# Patient Record
Sex: MALE | Race: WHITE | NOT HISPANIC OR LATINO | Employment: OTHER | URBAN - METROPOLITAN AREA
[De-identification: names, ages, dates, MRNs, and addresses within clinical notes are randomized per-mention and may not be internally consistent; named-entity substitution may affect disease eponyms.]

---

## 2017-05-25 ENCOUNTER — HOSPITAL ENCOUNTER (EMERGENCY)
Facility: HOSPITAL | Age: 82
Discharge: HOME | End: 2017-05-25
Admitting: EMERGENCY MEDICINE
Payer: MEDICARE

## 2017-05-25 VITALS
RESPIRATION RATE: 20 BRPM | OXYGEN SATURATION: 96 % | DIASTOLIC BLOOD PRESSURE: 84 MMHG | TEMPERATURE: 97.8 F | WEIGHT: 190 LBS | SYSTOLIC BLOOD PRESSURE: 177 MMHG | HEART RATE: 57 BPM

## 2017-05-25 DIAGNOSIS — S51.819A LACERATION OF FOREARM: Primary | ICD-10-CM

## 2017-05-25 PROCEDURE — 99282 EMERGENCY DEPT VISIT SF MDM: CPT

## 2017-05-25 PROCEDURE — 90471 IMMUNIZATION ADMIN: CPT

## 2017-05-25 PROCEDURE — 90715 TDAP VACCINE 7 YRS/> IM: CPT | Performed by: PHYSICIAN ASSISTANT

## 2017-05-25 RX ORDER — BACITRACIN, NEOMYCIN, POLYMYXIN B 400; 3.5; 5 [USP'U]/G; MG/G; [USP'U]/G
1 OINTMENT TOPICAL ONCE
Status: COMPLETED | OUTPATIENT
Start: 2017-05-25 | End: 2017-05-25

## 2017-05-25 RX ORDER — LIDOCAINE HYDROCHLORIDE AND EPINEPHRINE 20; 5 MG/ML; UG/ML
1 INJECTION, SOLUTION EPIDURAL; INFILTRATION; INTRACAUDAL; PERINEURAL ONCE
Status: COMPLETED | OUTPATIENT
Start: 2017-05-25 | End: 2017-05-25

## 2017-05-25 RX ORDER — LIDOCAINE HYDROCHLORIDE AND EPINEPHRINE BITARTRATE 20; .01 MG/ML; MG/ML
5 INJECTION, SOLUTION SUBCUTANEOUS ONCE
Status: DISCONTINUED | OUTPATIENT
Start: 2017-05-25 | End: 2017-05-25

## 2017-05-25 RX ORDER — AMLODIPINE BESYLATE 5 MG/1
10 TABLET ORAL DAILY
COMMUNITY

## 2017-05-25 RX ORDER — CARVEDILOL 12.5 MG/1
12.5 TABLET ORAL 2 TIMES DAILY WITH MEALS
COMMUNITY

## 2017-05-25 RX ADMIN — LIDOCAINE HYDROCHLORIDE,EPINEPHRINE BITARTRATE 1 ML: 20; .005 INJECTION, SOLUTION EPIDURAL; INFILTRATION; INTRACAUDAL; PERINEURAL at 13:25

## 2017-05-25 RX ADMIN — BACITRACIN, NEOMYCIN, POLYMYXIN B 1 SMALL APPLICATION: 400; 3.5; 5 OINTMENT TOPICAL at 13:45

## 2017-05-25 RX ADMIN — TETANUS TOXOID, REDUCED DIPHTHERIA TOXOID AND ACELLULAR PERTUSSIS VACCINE, ADSORBED 0.5 ML: 5; 2.5; 8; 8; 2.5 SUSPENSION INTRAMUSCULAR at 13:42

## 2017-06-01 ENCOUNTER — HOSPITAL ENCOUNTER (EMERGENCY)
Facility: HOSPITAL | Age: 82
Discharge: HOME | End: 2017-06-01
Attending: EMERGENCY MEDICINE | Admitting: EMERGENCY MEDICINE
Payer: MEDICARE

## 2017-06-01 VITALS
HEART RATE: 57 BPM | OXYGEN SATURATION: 96 % | DIASTOLIC BLOOD PRESSURE: 86 MMHG | RESPIRATION RATE: 18 BRPM | SYSTOLIC BLOOD PRESSURE: 187 MMHG | TEMPERATURE: 97.5 F

## 2017-06-01 DIAGNOSIS — Z48.02 VISIT FOR SUTURE REMOVAL: Primary | ICD-10-CM

## 2017-06-01 PROCEDURE — 99281 EMR DPT VST MAYX REQ PHY/QHP: CPT

## 2017-06-06 ENCOUNTER — HOSPITAL ENCOUNTER (EMERGENCY)
Facility: HOSPITAL | Age: 82
Discharge: HOME | End: 2017-06-06
Attending: EMERGENCY MEDICINE | Admitting: EMERGENCY MEDICINE
Payer: MEDICARE

## 2017-06-06 VITALS
TEMPERATURE: 97.4 F | OXYGEN SATURATION: 100 % | RESPIRATION RATE: 18 BRPM | HEART RATE: 61 BPM | DIASTOLIC BLOOD PRESSURE: 86 MMHG | SYSTOLIC BLOOD PRESSURE: 186 MMHG

## 2017-06-06 DIAGNOSIS — Z48.02 VISIT FOR SUTURE REMOVAL: Primary | ICD-10-CM

## 2017-06-06 PROCEDURE — 99281 EMR DPT VST MAYX REQ PHY/QHP: CPT

## 2019-09-23 ENCOUNTER — ANESTHESIA EVENT (OUTPATIENT)
Dept: GASTROENTEROLOGY | Facility: AMBULARY SURGERY CENTER | Age: 84
End: 2019-09-23

## 2019-09-23 ENCOUNTER — OFFICE VISIT (OUTPATIENT)
Dept: GASTROENTEROLOGY | Facility: AMBULARY SURGERY CENTER | Age: 84
End: 2019-09-23
Payer: MEDICARE

## 2019-09-23 VITALS
WEIGHT: 195.25 LBS | DIASTOLIC BLOOD PRESSURE: 70 MMHG | SYSTOLIC BLOOD PRESSURE: 130 MMHG | TEMPERATURE: 98.8 F | HEART RATE: 61 BPM | BODY MASS INDEX: 31.38 KG/M2 | HEIGHT: 66 IN

## 2019-09-23 DIAGNOSIS — K21.9 GASTROESOPHAGEAL REFLUX DISEASE, ESOPHAGITIS PRESENCE NOT SPECIFIED: ICD-10-CM

## 2019-09-23 DIAGNOSIS — R13.19 ESOPHAGEAL DYSPHAGIA: Primary | ICD-10-CM

## 2019-09-23 PROCEDURE — 99204 OFFICE O/P NEW MOD 45 MIN: CPT | Performed by: INTERNAL MEDICINE

## 2019-09-23 RX ORDER — DIPHENOXYLATE HYDROCHLORIDE AND ATROPINE SULFATE 2.5; .025 MG/1; MG/1
1 TABLET ORAL DAILY
COMMUNITY
End: 2019-09-24 | Stop reason: SDUPTHER

## 2019-09-23 RX ORDER — BIOTIN 5 MG
1 TABLET ORAL DAILY
COMMUNITY

## 2019-09-23 NOTE — PROGRESS NOTES
Assessment and Plan    #1  dysphagia  - possible  Zenker diverticulum/esophageal stricture/achalasia/esophageal stricture/ Schatzki ring/ esophageal ring/ R/O malignancy    · EGD with next visit/ biopsy/ dilation of ring if needed    #2  GERD    · Continue TUMS prn  · Consider protonix 40mg PO depending on EGD resuls    --------------------------------------------------------------------------------------------------------------------    Chief Complaint: food getting stuck in mid esophagus    HPI: Adan Beltre is a 80 y o  male who presents today for follow up for history of food getting stuck in esophagus    1 year PTC, patient develop  Symptoms of food getting stuck in mid sternum, causing pain, worsen with drinking water, he has to wait 15 mins for move to move from esophagus to stomach which causes pain as it moves  This is worsen with dry foods, chicken and steak  He also complaints of hoarseness as the day goes by     2 weeks PTC, patient develop difficulty swallowing which he went to see primary physician and diagnosed with swollen lymph nodes and was treated with claritin, but the globus sensation remained    Patient also reported long term problem of GERD that happens atleast 1 per week which he takes Tums that relieves the problem  He also complaints of drooling when he sleeps which has been going on for a long time  Patient denies any nausea, vomiting, abdominal pain, unexpected weight loss, diarrhea, constipation, blood in stool, or black tarry stools  Patient's last colonoscopy was 7 years ago and show Ulcerative colitis was treated and no maintenance medication  Patient's last endoscopy was many years ago and was found to have ulcer when he was younger           Review of Systems:   General: negative for fatigue, fever, night sweats or unexpected weight loss  Psychological: negative for anxiety or depression  Ophthalmic: negative for blurry vision or scleral icterus  ENT: negative for headaches, oral lesions, sore throat, vocal changes or dysphagia  Hematological and Lymphatic: negative for pallor or swollen lymph nodes  Respiratory: negative for cough, shortness of breath or wheezing  Cardiovascular: negative for chest pain, edema or murmur  Gastrointestinal: as mentioned in HPI  Genito-Urinary: negative for dysuria or incontinence  Musculoskeletal: negative for joint pain, joint stiffness or joint swelling  Dermatological: negative for pruritus, rash, or jaundice    Current Medications  Current Outpatient Medications   Medication Sig Dispense Refill    amLODIPine (NORVASC) 5 mg tablet Take 5 mg by mouth daily      carvedilol (COREG) 12 5 mg tablet Take 12 5 mg by mouth 2 (two) times a day with meals      Krill Oil 1000 MG CAPS Take 1 tablet by mouth daily      Multiple Vitamins-Minerals (ICAPS AREDS 2 PO) Take 1 capsule by mouth daily      multivitamin (THERAGRAN) TABS Take 1 tablet by mouth daily       No current facility-administered medications for this visit          Past Medical History  Past Medical History:   Diagnosis Date    Hyperlipidemia     Hypertension     PVC (premature ventricular contraction)        Past Surgical History  Past Surgical History:   Procedure Laterality Date    KNEE SURGERY  1994       Past Social History   Social History     Socioeconomic History    Marital status: /Civil Union     Spouse name: None    Number of children: None    Years of education: None    Highest education level: None   Occupational History    None   Social Needs    Financial resource strain: None    Food insecurity:     Worry: None     Inability: None    Transportation needs:     Medical: None     Non-medical: None   Tobacco Use    Smoking status: Former Smoker    Smokeless tobacco: Never Used    Tobacco comment: Quit at 21years of age    Substance and Sexual Activity    Alcohol use: Yes     Comment: Socially     Drug use: No    Sexual activity: None   Lifestyle  Physical activity:     Days per week: None     Minutes per session: None    Stress: None   Relationships    Social connections:     Talks on phone: None     Gets together: None     Attends Scientologist service: None     Active member of club or organization: None     Attends meetings of clubs or organizations: None     Relationship status: None    Intimate partner violence:     Fear of current or ex partner: None     Emotionally abused: None     Physically abused: None     Forced sexual activity: None   Other Topics Concern    None   Social History Narrative    None       The following portions of the patient's history were reviewed and updated as appropriate: allergies, current medications, past family history, past medical history, past social history, past surgical history and problem list     Vital Signs  Vitals:    09/23/19 0822   BP: 130/70   BP Location: Right arm   Patient Position: Sitting   Cuff Size: Standard   Pulse: 61   Temp: 98 8 °F (37 1 °C)   Weight: 88 6 kg (195 lb 4 oz)   Height: 5' 6" (1 676 m)       Physical Exam:  General appearance: alert, cooperative, no distress  HEENT: normocephalic, anicteric, no eye erythema or discharge, no oropharyngeal thrush  Neck: supple, trachea midline, no adenopathy  Lungs: CTA b/l, no rales, rhonchi, or wheezing, unlabored respirations  Heart: RRR, no murmur, rubs, or gallops  Abdomen: soft, non-tender, non-distended, normal bowel sounds, no masses or organomegaly  Rectal: deferred  Extremities: no cyanosis, clubbing, or edema  Musculoskeletal: normal gait  Skin: color and texture normal, no jaundice, no rashes or lesions  Psychiatric: alert and oriented, normal affect and behavior

## 2019-09-23 NOTE — LETTER
September 23, 2019     Anaya Mott, [address] [facility]     Patient: Beatrice Suarez   YOB: 1935   Date of Visit: 9/23/2019       Dear Dr Manny Vargas:    Thank you for referring Chuck Robles to me for evaluation  Below are my notes for this consultation  If you have questions, please do not hesitate to call me  I look forward to following your patient along with you  Sincerely,        Lata Dunn MD        CC: No Recipients  Lata Dunn MD  9/23/2019 10:05 AM  Sign at close encounter  Assessment and Plan    #1  dysphagia  - possible  Zenker diverticulum/esophageal stricture/achalasia/esophageal stricture/ Schatzki ring/ esophageal ring/ R/O malignancy    · EGD with next visit/ biopsy/ dilation of ring if needed    #2  GERD    · Continue TUMS prn  · Consider protonix 40mg PO depending on EGD resuls    --------------------------------------------------------------------------------------------------------------------    Chief Complaint: food getting stuck in mid esophagus    HPI: Beatrice Suarez is a 80 y o  male who presents today for follow up for history of food getting stuck in esophagus    1 year PTC, patient develop  Symptoms of food getting stuck in mid sternum, causing pain, worsen with drinking water, he has to wait 15 mins for move to move from esophagus to stomach which causes pain as it moves  This is worsen with dry foods, chicken and steak  He also complaints of hoarseness as the day goes by     2 weeks PTC, patient develop difficulty swallowing which he went to see primary physician and diagnosed with swollen lymph nodes and was treated with claritin, but the globus sensation remained    Patient also reported long term problem of GERD that happens atleast 1 per week which he takes Tums that relieves the problem  He also complaints of drooling when he sleeps which has been going on for a long time         Patient denies any nausea, vomiting, abdominal pain, unexpected weight loss, diarrhea, constipation, blood in stool, or black tarry stools  Patient's last colonoscopy was 7 years ago and show Ulcerative colitis was treated and no maintenance medication  Patient's last endoscopy was many years ago and was found to have ulcer when he was younger  Review of Systems:   General: negative for fatigue, fever, night sweats or unexpected weight loss  Psychological: negative for anxiety or depression  Ophthalmic: negative for blurry vision or scleral icterus  ENT: negative for headaches, oral lesions, sore throat, vocal changes or dysphagia  Hematological and Lymphatic: negative for pallor or swollen lymph nodes  Respiratory: negative for cough, shortness of breath or wheezing  Cardiovascular: negative for chest pain, edema or murmur  Gastrointestinal: as mentioned in HPI  Genito-Urinary: negative for dysuria or incontinence  Musculoskeletal: negative for joint pain, joint stiffness or joint swelling  Dermatological: negative for pruritus, rash, or jaundice    Current Medications  Current Outpatient Medications   Medication Sig Dispense Refill    amLODIPine (NORVASC) 5 mg tablet Take 5 mg by mouth daily      carvedilol (COREG) 12 5 mg tablet Take 12 5 mg by mouth 2 (two) times a day with meals      Krill Oil 1000 MG CAPS Take 1 tablet by mouth daily      Multiple Vitamins-Minerals (ICAPS AREDS 2 PO) Take 1 capsule by mouth daily      multivitamin (THERAGRAN) TABS Take 1 tablet by mouth daily       No current facility-administered medications for this visit          Past Medical History  Past Medical History:   Diagnosis Date    Hyperlipidemia     Hypertension     PVC (premature ventricular contraction)        Past Surgical History  Past Surgical History:   Procedure Laterality Date    KNEE SURGERY  1994       Past Social History   Social History     Socioeconomic History    Marital status: /Civil Union     Spouse name: None    Number of children: None    Years of education: None    Highest education level: None   Occupational History    None   Social Needs    Financial resource strain: None    Food insecurity:     Worry: None     Inability: None    Transportation needs:     Medical: None     Non-medical: None   Tobacco Use    Smoking status: Former Smoker    Smokeless tobacco: Never Used    Tobacco comment: Quit at 21years of age    Substance and Sexual Activity    Alcohol use: Yes     Comment: Socially     Drug use: No    Sexual activity: None   Lifestyle    Physical activity:     Days per week: None     Minutes per session: None    Stress: None   Relationships    Social connections:     Talks on phone: None     Gets together: None     Attends Mu-ism service: None     Active member of club or organization: None     Attends meetings of clubs or organizations: None     Relationship status: None    Intimate partner violence:     Fear of current or ex partner: None     Emotionally abused: None     Physically abused: None     Forced sexual activity: None   Other Topics Concern    None   Social History Narrative    None       The following portions of the patient's history were reviewed and updated as appropriate: allergies, current medications, past family history, past medical history, past social history, past surgical history and problem list     Vital Signs  Vitals:    09/23/19 0822   BP: 130/70   BP Location: Right arm   Patient Position: Sitting   Cuff Size: Standard   Pulse: 61   Temp: 98 8 °F (37 1 °C)   Weight: 88 6 kg (195 lb 4 oz)   Height: 5' 6" (1 676 m)       Physical Exam:  General appearance: alert, cooperative, no distress  HEENT: normocephalic, anicteric, no eye erythema or discharge, no oropharyngeal thrush  Neck: supple, trachea midline, no adenopathy  Lungs: CTA b/l, no rales, rhonchi, or wheezing, unlabored respirations  Heart: RRR, no murmur, rubs, or gallops  Abdomen: soft, non-tender, non-distended, normal bowel sounds, no masses or organomegaly  Rectal: deferred  Extremities: no cyanosis, clubbing, or edema  Musculoskeletal: normal gait  Skin: color and texture normal, no jaundice, no rashes or lesions  Psychiatric: alert and oriented, normal affect and behavior

## 2019-09-24 ENCOUNTER — HOSPITAL ENCOUNTER (OUTPATIENT)
Dept: GASTROENTEROLOGY | Facility: AMBULARY SURGERY CENTER | Age: 84
Setting detail: OUTPATIENT SURGERY
Discharge: HOME | End: 2019-09-24
Attending: INTERNAL MEDICINE | Admitting: INTERNAL MEDICINE
Payer: MEDICARE

## 2019-09-24 ENCOUNTER — ANESTHESIA (OUTPATIENT)
Dept: GASTROENTEROLOGY | Facility: AMBULARY SURGERY CENTER | Age: 84
End: 2019-09-24

## 2019-09-24 VITALS
DIASTOLIC BLOOD PRESSURE: 67 MMHG | WEIGHT: 188 LBS | HEIGHT: 69 IN | HEART RATE: 50 BPM | OXYGEN SATURATION: 98 % | SYSTOLIC BLOOD PRESSURE: 156 MMHG | RESPIRATION RATE: 18 BRPM | TEMPERATURE: 97 F | BODY MASS INDEX: 27.85 KG/M2

## 2019-09-24 DIAGNOSIS — K21.9 GASTROESOPHAGEAL REFLUX DISEASE, ESOPHAGITIS PRESENCE NOT SPECIFIED: ICD-10-CM

## 2019-09-24 DIAGNOSIS — K21.00 GASTROESOPHAGEAL REFLUX DISEASE WITH ESOPHAGITIS: Primary | ICD-10-CM

## 2019-09-24 PROCEDURE — C1726 CATH, BAL DIL, NON-VASCULAR: HCPCS

## 2019-09-24 PROCEDURE — 88305 TISSUE EXAM BY PATHOLOGIST: CPT | Performed by: PATHOLOGY

## 2019-09-24 PROCEDURE — 43239 EGD BIOPSY SINGLE/MULTIPLE: CPT | Performed by: INTERNAL MEDICINE

## 2019-09-24 PROCEDURE — 43249 ESOPH EGD DILATION <30 MM: CPT | Performed by: INTERNAL MEDICINE

## 2019-09-24 PROCEDURE — 1124F ACP DISCUSS-NO DSCNMKR DOCD: CPT | Performed by: INTERNAL MEDICINE

## 2019-09-24 RX ORDER — PANTOPRAZOLE SODIUM 40 MG/1
40 TABLET, DELAYED RELEASE ORAL 2 TIMES DAILY
Qty: 60 TABLET | Refills: 3 | Status: SHIPPED | OUTPATIENT
Start: 2019-09-24 | End: 2020-02-25

## 2019-09-24 RX ORDER — SODIUM CHLORIDE, SODIUM LACTATE, POTASSIUM CHLORIDE, CALCIUM CHLORIDE 600; 310; 30; 20 MG/100ML; MG/100ML; MG/100ML; MG/100ML
125 INJECTION, SOLUTION INTRAVENOUS CONTINUOUS
Status: DISCONTINUED | OUTPATIENT
Start: 2019-09-24 | End: 2019-09-28 | Stop reason: HOSPADM

## 2019-09-24 RX ORDER — PROPOFOL 10 MG/ML
INJECTION, EMULSION INTRAVENOUS AS NEEDED
Status: DISCONTINUED | OUTPATIENT
Start: 2019-09-24 | End: 2019-09-24 | Stop reason: SURG

## 2019-09-24 RX ADMIN — PROPOFOL 80 MG: 10 INJECTION, EMULSION INTRAVENOUS at 10:37

## 2019-09-24 RX ADMIN — PROPOFOL 50 MG: 10 INJECTION, EMULSION INTRAVENOUS at 10:33

## 2019-09-24 RX ADMIN — PROPOFOL 50 MG: 10 INJECTION, EMULSION INTRAVENOUS at 10:28

## 2019-09-24 RX ADMIN — PROPOFOL 40 MG: 10 INJECTION, EMULSION INTRAVENOUS at 10:42

## 2019-09-24 RX ADMIN — SODIUM CHLORIDE, SODIUM LACTATE, POTASSIUM CHLORIDE, AND CALCIUM CHLORIDE: .6; .31; .03; .02 INJECTION, SOLUTION INTRAVENOUS at 09:50

## 2019-09-24 RX ADMIN — PROPOFOL 20 MG: 10 INJECTION, EMULSION INTRAVENOUS at 10:39

## 2019-09-24 RX ADMIN — SODIUM CHLORIDE, SODIUM LACTATE, POTASSIUM CHLORIDE, AND CALCIUM CHLORIDE 125 ML/HR: .6; .31; .03; .02 INJECTION, SOLUTION INTRAVENOUS at 09:14

## 2019-09-24 NOTE — H&P
History and Physical - [facility]  Atiya García 80 y o  male MRN: [id number]    HPI: Atiya García is a 80y o  year old male who presents with GERD, dysphagia  Review of Systems    Historical Information   Past Medical History:   Diagnosis Date    Age-related macular degeneration     Hypertension     PVC (premature ventricular contraction)      Past Surgical History:   Procedure Laterality Date    KNEE SURGERY  1994     Social History   Social History     Substance and Sexual Activity   Alcohol Use Yes    Frequency: 4 or more times a week    Comment: Socially      Social History     Substance and Sexual Activity   Drug Use No     Social History     Tobacco Use   Smoking Status Former Smoker   Smokeless Tobacco Never Used   Tobacco Comment    Quit at 21years of age      Family History   Problem Relation Age of Onset    Uterine cancer Mother     Stroke Father     Prostate cancer Father        Meds/Allergies       (Not in a hospital admission)    Allergies   Allergen Reactions    Penicillins Other (See Comments)       Objective     /78   Pulse (!) 48   Temp (!) 97 2 °F (36 2 °C) (Temporal)   Resp 18   Ht 5' 9" (1 753 m)   Wt 85 3 kg (188 lb)   SpO2 97%   BMI 27 76 kg/m²       PHYSICAL EXAM    Gen: NAD  CV: RRR  CHEST: Clear  ABD: soft, NT/ND  EXT: no edema  Neuro: AAO      ASSESSMENT/PLAN:  This is a 80y o  year old male here for GERD, dysphagia           PLAN:   Procedure: Keyon Avalos

## 2019-09-24 NOTE — ANESTHESIA PREPROCEDURE EVALUATION
Review of Systems/Medical History  Patient summary reviewed  Chart reviewed  No history of anesthetic complications     Cardiovascular  Exercise tolerance (METS): >4,  Hypertension ,    Pulmonary  Smoker ex-smoker  ,        GI/Hepatic    GERD ,             Endo/Other     GYN       Hematology   Musculoskeletal       Neurology   Psychology           Physical Exam    Airway    Mallampati score: II  TM Distance: >3 FB  Neck ROM: full     Dental   No notable dental hx     Cardiovascular  Cardiovascular exam normal    Pulmonary  Pulmonary exam normal     Other Findings        Anesthesia Plan  ASA Score- 2     Anesthesia Type- IV sedation with anesthesia with ASA Monitors  Additional Monitors:   Airway Plan:         Plan Factors-    Induction-     Postoperative Plan-     Informed Consent- Anesthetic plan and risks discussed with patient  I personally reviewed this patient with the CRNA  Discussed and agreed on the Anesthesia Plan with the CRNA  Soniya Garcia

## 2019-10-01 ENCOUNTER — TELEPHONE (OUTPATIENT)
Dept: GASTROENTEROLOGY | Facility: CLINIC | Age: 84
End: 2019-10-01

## 2019-10-01 NOTE — TELEPHONE ENCOUNTER
Spoke to patient, notified of results  Pt stated that he leaves for [facility] and would like to know if he can schedule to be re-scoped the beginning of December  pls advise if that is okay

## 2019-10-01 NOTE — TELEPHONE ENCOUNTER
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

## 2019-10-01 NOTE — TELEPHONE ENCOUNTER
Spoke with pt wife, she is aware that they should be receiving a call from Courtney Dutta [phone] with an available date

## 2019-10-03 ENCOUNTER — TELEPHONE (OUTPATIENT)
Dept: GASTROENTEROLOGY | Facility: AMBULARY SURGERY CENTER | Age: 84
End: 2019-10-03

## 2019-10-03 NOTE — TELEPHONE ENCOUNTER
Pt is scheduled for egd w/ possible dilation on 12/2/2019 w/ dr Margaret Obregon @ [facility]

## 2019-11-18 ENCOUNTER — ANESTHESIA EVENT (OUTPATIENT)
Dept: ANESTHESIOLOGY | Facility: HOSPITAL | Age: 84
End: 2019-11-18

## 2019-11-18 ENCOUNTER — ANESTHESIA (OUTPATIENT)
Dept: ANESTHESIOLOGY | Facility: HOSPITAL | Age: 84
End: 2019-11-18

## 2019-12-02 ENCOUNTER — ANESTHESIA EVENT (OUTPATIENT)
Dept: GASTROENTEROLOGY | Facility: AMBULARY SURGERY CENTER | Age: 84
End: 2019-12-02

## 2019-12-02 ENCOUNTER — HOSPITAL ENCOUNTER (OUTPATIENT)
Dept: GASTROENTEROLOGY | Facility: AMBULARY SURGERY CENTER | Age: 84
Setting detail: OUTPATIENT SURGERY
Discharge: HOME | End: 2019-12-02
Attending: INTERNAL MEDICINE | Admitting: INTERNAL MEDICINE
Payer: MEDICARE

## 2019-12-02 ENCOUNTER — ANESTHESIA (OUTPATIENT)
Dept: GASTROENTEROLOGY | Facility: AMBULARY SURGERY CENTER | Age: 84
End: 2019-12-02

## 2019-12-02 VITALS
DIASTOLIC BLOOD PRESSURE: 66 MMHG | SYSTOLIC BLOOD PRESSURE: 149 MMHG | TEMPERATURE: 97.3 F | RESPIRATION RATE: 18 BRPM | OXYGEN SATURATION: 99 % | WEIGHT: 187 LBS | HEART RATE: 46 BPM | HEIGHT: 70 IN | BODY MASS INDEX: 26.77 KG/M2

## 2019-12-02 DIAGNOSIS — K22.70 BARRETT'S ESOPHAGUS WITHOUT DYSPLASIA: ICD-10-CM

## 2019-12-02 PROCEDURE — 88342 IMHCHEM/IMCYTCHM 1ST ANTB: CPT | Performed by: PATHOLOGY

## 2019-12-02 PROCEDURE — 43239 EGD BIOPSY SINGLE/MULTIPLE: CPT | Performed by: INTERNAL MEDICINE

## 2019-12-02 PROCEDURE — 43248 EGD GUIDE WIRE INSERTION: CPT | Performed by: INTERNAL MEDICINE

## 2019-12-02 PROCEDURE — 88305 TISSUE EXAM BY PATHOLOGIST: CPT | Performed by: PATHOLOGY

## 2019-12-02 RX ORDER — PROPOFOL 10 MG/ML
INJECTION, EMULSION INTRAVENOUS AS NEEDED
Status: DISCONTINUED | OUTPATIENT
Start: 2019-12-02 | End: 2019-12-02 | Stop reason: SURG

## 2019-12-02 RX ORDER — SODIUM CHLORIDE, SODIUM LACTATE, POTASSIUM CHLORIDE, CALCIUM CHLORIDE 600; 310; 30; 20 MG/100ML; MG/100ML; MG/100ML; MG/100ML
125 INJECTION, SOLUTION INTRAVENOUS CONTINUOUS
Status: DISCONTINUED | OUTPATIENT
Start: 2019-12-02 | End: 2019-12-06 | Stop reason: HOSPADM

## 2019-12-02 RX ORDER — LIDOCAINE HYDROCHLORIDE 10 MG/ML
INJECTION, SOLUTION INFILTRATION; PERINEURAL AS NEEDED
Status: DISCONTINUED | OUTPATIENT
Start: 2019-12-02 | End: 2019-12-02 | Stop reason: SURG

## 2019-12-02 RX ADMIN — PROPOFOL 50 MG: 10 INJECTION, EMULSION INTRAVENOUS at 08:29

## 2019-12-02 RX ADMIN — SODIUM CHLORIDE, SODIUM LACTATE, POTASSIUM CHLORIDE, AND CALCIUM CHLORIDE 125 ML/HR: .6; .31; .03; .02 INJECTION, SOLUTION INTRAVENOUS at 08:09

## 2019-12-02 RX ADMIN — PROPOFOL 50 MG: 10 INJECTION, EMULSION INTRAVENOUS at 08:34

## 2019-12-02 RX ADMIN — PROPOFOL 50 MG: 10 INJECTION, EMULSION INTRAVENOUS at 08:30

## 2019-12-02 RX ADMIN — LIDOCAINE HYDROCHLORIDE 50 MG: 10 INJECTION, SOLUTION INFILTRATION; PERINEURAL at 08:29

## 2019-12-02 NOTE — H&P
History and Physical - [facility]  Sandra Snowden 80 y o  male MRN: [id number]    HPI: Sandra Snowden is a 80y o  year old male who presents with dysphagia, hx of esophageal stricture  Review of Systems    Historical Information   Past Medical History:   Diagnosis Date    Age-related macular degeneration     Hypertension     PVC (premature ventricular contraction)      Past Surgical History:   Procedure Laterality Date    KNEE SURGERY  1994     Social History   Social History     Substance and Sexual Activity   Alcohol Use Yes    Frequency: 4 or more times a week    Comment: Socially      Social History     Substance and Sexual Activity   Drug Use No     Social History     Tobacco Use   Smoking Status Former Smoker   Smokeless Tobacco Never Used   Tobacco Comment    Quit at 21years of age      Family History   Problem Relation Age of Onset    Uterine cancer Mother     Stroke Father     Prostate cancer Father        Meds/Allergies       (Not in a hospital admission)    Allergies   Allergen Reactions    Penicillins Other (See Comments)       Objective     BP (!) 174/74   Pulse (!) 49   Temp (!) 97 3 °F (36 3 °C) (Temporal)   Resp 16   Ht 5' 10" (1 778 m)   Wt 84 8 kg (187 lb)   SpO2 97%   BMI 26 83 kg/m²       PHYSICAL EXAM    Gen: NAD  CV: RRR  CHEST: Clear  ABD: soft, NT/ND  EXT: no edema  Neuro: AAO      ASSESSMENT/PLAN:  This is a 80y o  year old male here for with dysphagia, hx of esophageal stricture         PLAN:   Procedure: egd w dilation

## 2019-12-02 NOTE — ANESTHESIA POSTPROCEDURE EVALUATION
Post-Op Assessment Note    CV Status:  Stable    Pain management: adequate     Mental Status:  Alert and awake   Hydration Status:  Euvolemic   PONV Controlled:  Controlled   Airway Patency:  Patent   Post Op Vitals Reviewed: Yes      Staff: CRNA           BP  52   Temp     Pulse 112/54   Resp 16   SpO2   95

## 2019-12-02 NOTE — ANESTHESIA PREPROCEDURE EVALUATION
Review of Systems/Medical History  Patient summary reviewed  Chart reviewed  No history of anesthetic complications     Cardiovascular  Exercise tolerance (METS): >4,  Hypertension ,    Pulmonary  Smoker ex-smoker  ,        GI/Hepatic    GERD ,        Negative  ROS        Endo/Other  Negative endo/other ROS      GYN       Hematology  Negative hematology ROS      Musculoskeletal  Negative musculoskeletal ROS        Neurology  Negative neurology ROS      Psychology   Negative psychology ROS              Physical Exam    Airway    Mallampati score: II  TM Distance: >3 FB  Neck ROM: full     Dental       Cardiovascular      Pulmonary      Other Findings        Anesthesia Plan  ASA Score- 2     Anesthesia Type- IV sedation with anesthesia with ASA Monitors  Additional Monitors:   Airway Plan:         Plan Factors-    Induction- intravenous  Postoperative Plan-     Informed Consent- Anesthetic plan and risks discussed with patient  I personally reviewed this patient with the CRNA  Discussed and agreed on the Anesthesia Plan with the CRNA  Daniel Alejo

## 2019-12-16 ENCOUNTER — TELEPHONE (OUTPATIENT)
Dept: GASTROENTEROLOGY | Facility: AMBULARY SURGERY CENTER | Age: 84
End: 2019-12-16

## 2019-12-16 NOTE — TELEPHONE ENCOUNTER
----- Message from Damaso Cunningham MD sent at 12/15/2019  8:51 PM EST -----  Please inform the patient that biopsies from stomach were negative for H pylori, biopsies from esophagus did not show any signs of Hampton's esophagus which is good news  Please make sure that he continues take pantoprazole once daily  Please have him follow-up in the office in 6 months time, call with any questions or concerns or if he has any worsening symptoms in the interim

## 2020-01-06 ENCOUNTER — TELEPHONE (OUTPATIENT)
Dept: GASTROENTEROLOGY | Facility: AMBULARY SURGERY CENTER | Age: 85
End: 2020-01-06

## 2020-02-24 DIAGNOSIS — K21.9 GASTROESOPHAGEAL REFLUX DISEASE, ESOPHAGITIS PRESENCE NOT SPECIFIED: ICD-10-CM

## 2020-02-24 DIAGNOSIS — K21.00 GASTROESOPHAGEAL REFLUX DISEASE WITH ESOPHAGITIS: ICD-10-CM

## 2020-02-25 RX ORDER — PANTOPRAZOLE SODIUM 40 MG/1
TABLET, DELAYED RELEASE ORAL
Qty: 60 TABLET | Refills: 0 | Status: SHIPPED | OUTPATIENT
Start: 2020-02-25 | End: 2020-05-04 | Stop reason: SDUPTHER

## 2020-05-04 DIAGNOSIS — K21.9 GASTROESOPHAGEAL REFLUX DISEASE, ESOPHAGITIS PRESENCE NOT SPECIFIED: ICD-10-CM

## 2020-05-04 DIAGNOSIS — K21.00 GASTROESOPHAGEAL REFLUX DISEASE WITH ESOPHAGITIS: ICD-10-CM

## 2020-05-04 RX ORDER — PANTOPRAZOLE SODIUM 40 MG/1
40 TABLET, DELAYED RELEASE ORAL DAILY
Qty: 90 TABLET | Refills: 3 | Status: SHIPPED | OUTPATIENT
Start: 2020-05-04 | End: 2020-08-02

## 2020-06-09 ENCOUNTER — TELEMEDICINE (OUTPATIENT)
Dept: GASTROENTEROLOGY | Facility: AMBULARY SURGERY CENTER | Age: 85
End: 2020-06-09
Payer: MEDICARE

## 2020-06-09 DIAGNOSIS — K21.00 GASTROESOPHAGEAL REFLUX DISEASE WITH ESOPHAGITIS: Primary | ICD-10-CM

## 2020-06-09 PROCEDURE — 99214 OFFICE O/P EST MOD 30 MIN: CPT | Performed by: INTERNAL MEDICINE

## 2021-01-26 ENCOUNTER — IMMUNIZATIONS (OUTPATIENT)
Dept: FAMILY MEDICINE CLINIC | Facility: HOSPITAL | Age: 86
End: 2021-01-26

## 2021-01-26 DIAGNOSIS — Z23 ENCOUNTER FOR IMMUNIZATION: Primary | ICD-10-CM

## 2021-01-26 PROCEDURE — 91301 SARS-COV-2 / COVID-19 MRNA VACCINE (MODERNA) 100 MCG: CPT

## 2021-01-26 PROCEDURE — 0011A SARS-COV-2 / COVID-19 MRNA VACCINE (MODERNA) 100 MCG: CPT

## 2021-02-21 ENCOUNTER — IMMUNIZATIONS (OUTPATIENT)
Dept: FAMILY MEDICINE CLINIC | Facility: HOSPITAL | Age: 86
End: 2021-02-21

## 2021-02-21 DIAGNOSIS — Z23 ENCOUNTER FOR IMMUNIZATION: Primary | ICD-10-CM

## 2021-02-21 PROCEDURE — 0012A SARS-COV-2 / COVID-19 MRNA VACCINE (MODERNA) 100 MCG: CPT

## 2021-02-21 PROCEDURE — 91301 SARS-COV-2 / COVID-19 MRNA VACCINE (MODERNA) 100 MCG: CPT

## 2021-11-17 ENCOUNTER — IMMUNIZATIONS (OUTPATIENT)
Dept: FAMILY MEDICINE CLINIC | Facility: HOSPITAL | Age: 86
End: 2021-11-17

## 2021-11-17 DIAGNOSIS — Z23 ENCOUNTER FOR IMMUNIZATION: Primary | ICD-10-CM

## 2021-11-17 PROCEDURE — 0064A COVID-19 MODERNA VACC 0.25 ML BOOSTER: CPT

## 2021-11-17 PROCEDURE — 91306 COVID-19 MODERNA VACC 0.25 ML BOOSTER: CPT

## 2022-05-24 ENCOUNTER — ESTABLISHED COMPREHENSIVE EXAM (OUTPATIENT)
Dept: URBAN - METROPOLITAN AREA CLINIC 6 | Facility: CLINIC | Age: 87
End: 2022-05-24

## 2022-05-24 DIAGNOSIS — Z96.1: ICD-10-CM

## 2022-05-24 DIAGNOSIS — H35.3111: ICD-10-CM

## 2022-05-24 PROCEDURE — 92014 COMPRE OPH EXAM EST PT 1/>: CPT

## 2022-05-24 ASSESSMENT — TONOMETRY
OD_IOP_MMHG: 23
OS_IOP_MMHG: 23

## 2022-05-24 ASSESSMENT — VISUAL ACUITY
OS_PH: 20/30-2
OD_SC: 20/30
OU_CC: J1
OS_SC: 20/40-1

## 2023-05-11 ENCOUNTER — TELEPHONE (OUTPATIENT)
Dept: GASTROENTEROLOGY | Facility: CLINIC | Age: 88
End: 2023-05-11

## 2023-05-11 NOTE — TELEPHONE ENCOUNTER
SPOKE WITH PT, REPORTS NEW CONSTIPATION OVER THE PAST MONTH, INTERMITTENT BRBPR ON TOILET TISSUE  DENIES SOB, DIZZINESS LIGHTHEADEDNESS, ABD PAIN  C/O SOME RECTAL DISCOMFORT FROM HARD STOOLS  ADVISED HYDRATION, INCREASE DIETARY FIBER, FIBER SUPPLEMENTATION, AVOID STRAINING, CAN TRIAL MIRALAX DAILY AS WELL AS STOOL SOFTENER, PREPARATION H AS NEEDED  PT PREFERS APPT WITH DR Tomasz Kate

## 2023-05-11 NOTE — TELEPHONE ENCOUNTER
Patients GI provider:  Dr Dariel Jorge    Number to return call: [phone]    Reason for call: Pt calling because he has blood in his stool and doesn't want to go to the [place] location and doesn't want to wait until August  He would like a sooner appt if possible      Scheduled procedure/appointment date if applicable: N/A

## 2023-05-17 ENCOUNTER — APPOINTMENT (OUTPATIENT)
Dept: LAB | Facility: AMBULARY SURGERY CENTER | Age: 88
End: 2023-05-17
Attending: INTERNAL MEDICINE

## 2023-05-17 ENCOUNTER — OFFICE VISIT (OUTPATIENT)
Dept: GASTROENTEROLOGY | Facility: AMBULARY SURGERY CENTER | Age: 88
End: 2023-05-17

## 2023-05-17 VITALS
DIASTOLIC BLOOD PRESSURE: 78 MMHG | OXYGEN SATURATION: 99 % | HEART RATE: 59 BPM | WEIGHT: 184.4 LBS | BODY MASS INDEX: 26.4 KG/M2 | HEIGHT: 70 IN | SYSTOLIC BLOOD PRESSURE: 141 MMHG

## 2023-05-17 DIAGNOSIS — L30.9 DERMATITIS: ICD-10-CM

## 2023-05-17 DIAGNOSIS — R13.19 ESOPHAGEAL DYSPHAGIA: Primary | ICD-10-CM

## 2023-05-17 DIAGNOSIS — K62.5 BRBPR (BRIGHT RED BLOOD PER RECTUM): ICD-10-CM

## 2023-05-17 PROBLEM — R13.10 DYSPHAGIA: Status: ACTIVE | Noted: 2023-05-17

## 2023-05-17 LAB
BASOPHILS # BLD AUTO: 0.05 THOUSANDS/ÂΜL (ref 0–0.1)
BASOPHILS NFR BLD AUTO: 1 % (ref 0–1)
EOSINOPHIL # BLD AUTO: 0.12 THOUSAND/ÂΜL (ref 0–0.61)
EOSINOPHIL NFR BLD AUTO: 2 % (ref 0–6)
ERYTHROCYTE [DISTWIDTH] IN BLOOD BY AUTOMATED COUNT: 13.2 % (ref 11.6–15.1)
HCT VFR BLD AUTO: 39.2 % (ref 36.5–49.3)
HGB BLD-MCNC: 13 G/DL (ref 12–17)
IMM GRANULOCYTES # BLD AUTO: 0.03 THOUSAND/UL (ref 0–0.2)
IMM GRANULOCYTES NFR BLD AUTO: 0 % (ref 0–2)
LYMPHOCYTES # BLD AUTO: 1.77 THOUSANDS/ÂΜL (ref 0.6–4.47)
LYMPHOCYTES NFR BLD AUTO: 22 % (ref 14–44)
MCH RBC QN AUTO: 33 PG (ref 26.8–34.3)
MCHC RBC AUTO-ENTMCNC: 33.2 G/DL (ref 31.4–37.4)
MCV RBC AUTO: 100 FL (ref 82–98)
MONOCYTES # BLD AUTO: 1.03 THOUSAND/ÂΜL (ref 0.17–1.22)
MONOCYTES NFR BLD AUTO: 13 % (ref 4–12)
NEUTROPHILS # BLD AUTO: 5.17 THOUSANDS/ÂΜL (ref 1.85–7.62)
NEUTS SEG NFR BLD AUTO: 62 % (ref 43–75)
NRBC BLD AUTO-RTO: 0 /100 WBCS
PLATELET # BLD AUTO: 336 THOUSANDS/UL (ref 149–390)
PMV BLD AUTO: 9.2 FL (ref 8.9–12.7)
RBC # BLD AUTO: 3.94 MILLION/UL (ref 3.88–5.62)
WBC # BLD AUTO: 8.17 THOUSAND/UL (ref 4.31–10.16)

## 2023-05-17 RX ORDER — AMLODIPINE BESYLATE 10 MG/1
10 TABLET ORAL DAILY
COMMUNITY
Start: 2023-03-16

## 2023-05-17 NOTE — LETTER
May 17, 2023     Marlo Banegas [id number]    Patient: Jayce Sheehan   YOB: 1935   Date of Visit: 5/17/2023       Dear Dr Yumiko Mack:    Thank you for referring Deyvibrianna Goldstein to me for evaluation  Below are my notes for this consultation  If you have questions, please do not hesitate to call me  I look forward to following your patient along with you  Sincerely,        Cullen Almaguer MD        CC: No Recipients  Cullen Almaguer MD  5/17/2023 10:41 AM  Sign when Signing Visit  [address] [facility]  Jayce Sheehan 80 y o  male MRN: [id number]            Assessment & Plan:    22-year-old gentleman here with intermittent dysphagia, bright red blood per rectum and significant perianal dermatitis  1   Perianal dermatitis: Most likely secondary to topical treatments for hemorrhoids  -Recommended using warm water after bowel movements, wet wipes, topical barrier ointment such as triple paste for diaper rash  -Loosefitting clothing  -If not better after 2 weeks follow-up with PCP, may benefit from topical steroids or antifungal, no evidence of fungal infection at this time    2  Bright red blood per rectum: Most likely secondary to hemorrhoids in the setting of constipation however patient has had change in bowel moods and rectal bleeding  -Proceed with colonoscopy to exclude luminal pathology  We will check CBC    3  Dysphagia: Secondary to patient's known history of Schatzki's ring  -Schedule upper endoscopy the same time with potential dilation  -Discussed with him risks of procedure including bleeding, surgery, perforation      Venu Munoz was seen today for rectal bleeding  Diagnoses and all orders for this visit:    Esophageal dysphagia  -     EGD; Future    BRBPR (bright red blood per rectum)  -     CBC and differential; Future  -     Colonoscopy;  Future    Dermatitis    Other orders  -     Diet NPO; Sips with meds; Standing  -     Void on call to OR; Standing            _____________________________________________________________        CC: Bright red blood per rectum    HPI:  Charleen Rubin is a 80 y o male who is here for    Blood per rectum  This is a pleasant 43-year-old gentleman, previously seen for history of dysphagia with known Schatzki's ring, he reports his symptoms have recurred recently  He did well after his last EGD with dilation  Recently has had difficulty swallowing certain meats and solid foods require some liquids to help get this foods down  However since February this year has had change in bowel movements with new onset of constipation associated with this he is noted bright red blood per rectum on the toilet paper  He has tried over-the-counter topical creams and Preparation H and now reports a sore around his gluteal area  Denies any abdominal pain, nausea, vomiting  Dysphagia as noted above  No typical heartburn symptoms  He is intentionally lost 10 pounds  ROS:  The remainder of the ROS was negative except for the pertinent positives mentioned in HPI        Allergies: Penicillins    Medications:   Current Outpatient Medications:   •  amLODIPine (NORVASC) 10 mg tablet, Take 10 mg by mouth daily, Disp: , Rfl:   •  carvedilol (COREG) 12 5 mg tablet, Take 12 5 mg by mouth 2 (two) times a day with meals, Disp: , Rfl:   •  Krill Oil 1000 MG CAPS, Take 1 tablet by mouth daily, Disp: , Rfl:   •  Multiple Vitamins-Minerals (ICAPS AREDS 2 PO), Take 1 capsule by mouth daily, Disp: , Rfl:   •  amLODIPine (NORVASC) 5 mg tablet, Take 10 mg by mouth daily (Patient not taking: Reported on 5/17/2023), Disp: , Rfl:   •  pantoprazole (PROTONIX) 40 mg tablet, Take 1 tablet (40 mg total) by mouth daily, Disp: 90 tablet, Rfl: 3    Past Medical History:   Diagnosis Date   • Age-related macular degeneration    • Hypertension    • PVC (premature ventricular contraction)        Past Surgical History:   Procedure Laterality Date   • KNEE "SURGERY  1994       Family History   Problem Relation Age of Onset   • Uterine cancer Mother    • Stroke Father    • Prostate cancer Father         reports that he has quit smoking  He has never used smokeless tobacco  He reports current alcohol use  He reports that he does not use drugs  Physical Exam:    /78 (BP Location: Right arm, Patient Position: Sitting, Cuff Size: Standard)   Pulse 59   Ht 5' 10\" (1 778 m)   Wt 83 6 kg (184 lb 6 4 oz)   SpO2 99%   BMI 26 46 kg/m²     Gen: wn/wd, NAD, healthy-appearing gentleman  HEENT: anicteric, MMM, no cervical LAD  CVS: RRR, no m/r/g  CHEST: CTA b/l  ABD: +BS, soft, NT,ND, no hepatosplenomegaly  EXT: no c/c/e  NEURO: aaox3  SKIN: Significant excoriated area around perianal area extending approximately 7 cm with superficial ulcerations        "

## 2023-05-17 NOTE — PATIENT INSTRUCTIONS
Triple paste, Butt paste (baby isle) apply twice  Dont use dry toilet paper  After BM, use water with a spray or squirt bottle  Unscented, hypoallergenic baby wipes       Scheduled date of EGD/colonoscopy (as of today): TBD Augsut 2023  Physician performing EGD/colonoscopy: dr Emily Morales  [facility] of [facility]  Desired bowel prep reviewed with patient: miralax  dulcolax  Instructions reviewed with patient by:jo-ann  Clearances:   n a

## 2023-05-17 NOTE — PROGRESS NOTES
[address] [facility]  Tamika Reid 80 y o  male MRN: [id number]            Assessment & Plan:    59-year-old gentleman here with intermittent dysphagia, bright red blood per rectum and significant perianal dermatitis  1   Perianal dermatitis: Most likely secondary to topical treatments for hemorrhoids  -Recommended using warm water after bowel movements, wet wipes, topical barrier ointment such as triple paste for diaper rash  -Loosefitting clothing  -If not better after 2 weeks follow-up with PCP, may benefit from topical steroids or antifungal, no evidence of fungal infection at this time    2  Bright red blood per rectum: Most likely secondary to hemorrhoids in the setting of constipation however patient has had change in bowel moods and rectal bleeding  -Proceed with colonoscopy to exclude luminal pathology  We will check CBC    3  Dysphagia: Secondary to patient's known history of Schatzki's ring  -Schedule upper endoscopy the same time with potential dilation  -Discussed with him risks of procedure including bleeding, surgery, perforation      Ambreen Wan was seen today for rectal bleeding  Diagnoses and all orders for this visit:    Esophageal dysphagia  -     EGD; Future    BRBPR (bright red blood per rectum)  -     CBC and differential; Future  -     Colonoscopy; Future    Dermatitis    Other orders  -     Diet NPO; Sips with meds; Standing  -     Void on call to OR; Standing            _____________________________________________________________        CC: Bright red blood per rectum    HPI:  Tamika Reid is a 80 y o male who is here for    Blood per rectum  This is a pleasant 59-year-old gentleman, previously seen for history of dysphagia with known Schatzki's ring, he reports his symptoms have recurred recently  He did well after his last EGD with dilation  Recently has had difficulty swallowing certain meats and solid foods require some liquids to help get this foods down    However "since February this year has had change in bowel movements with new onset of constipation associated with this he is noted bright red blood per rectum on the toilet paper  He has tried over-the-counter topical creams and Preparation H and now reports a sore around his gluteal area  Denies any abdominal pain, nausea, vomiting  Dysphagia as noted above  No typical heartburn symptoms  He is intentionally lost 10 pounds  ROS:  The remainder of the ROS was negative except for the pertinent positives mentioned in HPI  Allergies: Penicillins    Medications:   Current Outpatient Medications:   •  amLODIPine (NORVASC) 10 mg tablet, Take 10 mg by mouth daily, Disp: , Rfl:   •  carvedilol (COREG) 12 5 mg tablet, Take 12 5 mg by mouth 2 (two) times a day with meals, Disp: , Rfl:   •  Krill Oil 1000 MG CAPS, Take 1 tablet by mouth daily, Disp: , Rfl:   •  Multiple Vitamins-Minerals (ICAPS AREDS 2 PO), Take 1 capsule by mouth daily, Disp: , Rfl:   •  amLODIPine (NORVASC) 5 mg tablet, Take 10 mg by mouth daily (Patient not taking: Reported on 5/17/2023), Disp: , Rfl:   •  pantoprazole (PROTONIX) 40 mg tablet, Take 1 tablet (40 mg total) by mouth daily, Disp: 90 tablet, Rfl: 3    Past Medical History:   Diagnosis Date   • Age-related macular degeneration    • Hypertension    • PVC (premature ventricular contraction)        Past Surgical History:   Procedure Laterality Date   • KNEE SURGERY  1994       Family History   Problem Relation Age of Onset   • Uterine cancer Mother    • Stroke Father    • Prostate cancer Father         reports that he has quit smoking  He has never used smokeless tobacco  He reports current alcohol use  He reports that he does not use drugs        Physical Exam:    /78 (BP Location: Right arm, Patient Position: Sitting, Cuff Size: Standard)   Pulse 59   Ht 5' 10\" (1 778 m)   Wt 83 6 kg (184 lb 6 4 oz)   SpO2 99%   BMI 26 46 kg/m²     Gen: wn/wd, NAD, healthy-appearing " gentleman  HEENT: anicteric, MMM, no cervical LAD  CVS: RRR, no m/r/g  CHEST: CTA b/l  ABD: +BS, soft, NT,ND, no hepatosplenomegaly  EXT: no c/c/e  NEURO: aaox3  SKIN: Significant excoriated area around perianal area extending approximately 7 cm with superficial ulcerations

## 2023-06-08 ENCOUNTER — TELEPHONE (OUTPATIENT)
Dept: GASTROENTEROLOGY | Facility: AMBULARY SURGERY CENTER | Age: 88
End: 2023-06-08

## 2023-06-08 NOTE — TELEPHONE ENCOUNTER
Called pt to schedule colonoscopy  Pt stats that he needs to be scheduled after 09/15/2023 but the schedule for dr Dionte Calderon is not released yet for September  Discussed with pt to be scheduled on July and Augest but he is not available these 2 months  Stated that we will try to reach him again once we have schedule on September for Dr Dionte Calderon

## 2023-06-16 ENCOUNTER — TELEPHONE (OUTPATIENT)
Dept: GASTROENTEROLOGY | Facility: CLINIC | Age: 88
End: 2023-06-16

## 2023-06-16 NOTE — TELEPHONE ENCOUNTER
Patient calling again,stating his wife looked at the rash it seems to be doing better  Patient states he will continue to do what he is doing

## 2023-06-16 NOTE — TELEPHONE ENCOUNTER
Patient called the office stating he still has the rash and the baby butt paste     He would like to know what else can he do or can be prescribed since they aren't working

## 2023-06-16 NOTE — TELEPHONE ENCOUNTER
Spoke with patient, he explains his rash on rectum is looking better with using barrier cream  I advised he can follow up with his PCP for other recommendations if noticing barrier creams are not working  He understood

## 2023-06-23 ENCOUNTER — TELEPHONE (OUTPATIENT)
Age: 88
End: 2023-06-23

## 2023-06-23 NOTE — TELEPHONE ENCOUNTER
Scheduled date of EGD/colonoscopy (as of today):08/10/2023  Physician performing EGD/colonoscopy: Dr Shar Rivera  Location of EGD/colonoscopy: [facility]

## 2023-08-10 ENCOUNTER — ANESTHESIA EVENT (OUTPATIENT)
Dept: GASTROENTEROLOGY | Facility: HOSPITAL | Age: 88
End: 2023-08-10

## 2023-08-10 ENCOUNTER — ANESTHESIA (OUTPATIENT)
Dept: GASTROENTEROLOGY | Facility: HOSPITAL | Age: 88
End: 2023-08-10

## 2023-08-10 ENCOUNTER — HOSPITAL ENCOUNTER (OUTPATIENT)
Dept: GASTROENTEROLOGY | Facility: HOSPITAL | Age: 88
Setting detail: OUTPATIENT SURGERY
Discharge: HOME | End: 2023-08-10
Attending: INTERNAL MEDICINE
Payer: MEDICARE

## 2023-08-10 VITALS
TEMPERATURE: 98.1 F | OXYGEN SATURATION: 96 % | HEART RATE: 49 BPM | WEIGHT: 180 LBS | BODY MASS INDEX: 28.25 KG/M2 | HEIGHT: 67 IN | SYSTOLIC BLOOD PRESSURE: 150 MMHG | RESPIRATION RATE: 18 BRPM | DIASTOLIC BLOOD PRESSURE: 63 MMHG

## 2023-08-10 DIAGNOSIS — R13.19 ESOPHAGEAL DYSPHAGIA: ICD-10-CM

## 2023-08-10 DIAGNOSIS — K21.9 GASTROESOPHAGEAL REFLUX DISEASE WITHOUT ESOPHAGITIS: Primary | ICD-10-CM

## 2023-08-10 DIAGNOSIS — K62.5 BRBPR (BRIGHT RED BLOOD PER RECTUM): ICD-10-CM

## 2023-08-10 PROCEDURE — 43239 EGD BIOPSY SINGLE/MULTIPLE: CPT | Performed by: INTERNAL MEDICINE

## 2023-08-10 PROCEDURE — 88305 TISSUE EXAM BY PATHOLOGIST: CPT | Performed by: PATHOLOGY

## 2023-08-10 PROCEDURE — 43249 ESOPH EGD DILATION <30 MM: CPT | Performed by: INTERNAL MEDICINE

## 2023-08-10 PROCEDURE — 45380 COLONOSCOPY AND BIOPSY: CPT | Performed by: INTERNAL MEDICINE

## 2023-08-10 PROCEDURE — C1726 CATH, BAL DIL, NON-VASCULAR: HCPCS

## 2023-08-10 RX ORDER — PANTOPRAZOLE SODIUM 40 MG/1
40 TABLET, DELAYED RELEASE ORAL DAILY
Qty: 30 TABLET | Refills: 3 | Status: SHIPPED | OUTPATIENT
Start: 2023-08-10

## 2023-08-10 RX ORDER — SODIUM CHLORIDE, SODIUM LACTATE, POTASSIUM CHLORIDE, CALCIUM CHLORIDE 600; 310; 30; 20 MG/100ML; MG/100ML; MG/100ML; MG/100ML
INJECTION, SOLUTION INTRAVENOUS CONTINUOUS PRN
Status: DISCONTINUED | OUTPATIENT
Start: 2023-08-10 | End: 2023-08-10

## 2023-08-10 RX ORDER — EPHEDRINE SULFATE 50 MG/ML
INJECTION INTRAVENOUS AS NEEDED
Status: DISCONTINUED | OUTPATIENT
Start: 2023-08-10 | End: 2023-08-10

## 2023-08-10 RX ORDER — LIDOCAINE HYDROCHLORIDE 10 MG/ML
INJECTION, SOLUTION EPIDURAL; INFILTRATION; INTRACAUDAL; PERINEURAL AS NEEDED
Status: DISCONTINUED | OUTPATIENT
Start: 2023-08-10 | End: 2023-08-10

## 2023-08-10 RX ORDER — PROPOFOL 10 MG/ML
INJECTION, EMULSION INTRAVENOUS AS NEEDED
Status: DISCONTINUED | OUTPATIENT
Start: 2023-08-10 | End: 2023-08-10

## 2023-08-10 RX ADMIN — SODIUM CHLORIDE, SODIUM LACTATE, POTASSIUM CHLORIDE, AND CALCIUM CHLORIDE: .6; .31; .03; .02 INJECTION, SOLUTION INTRAVENOUS at 08:15

## 2023-08-10 RX ADMIN — PROPOFOL 50 MG: 10 INJECTION, EMULSION INTRAVENOUS at 08:24

## 2023-08-10 RX ADMIN — PROPOFOL 100 MCG/KG/MIN: 10 INJECTION, EMULSION INTRAVENOUS at 08:26

## 2023-08-10 RX ADMIN — EPHEDRINE SULFATE 10 MG: 50 INJECTION, SOLUTION INTRAVENOUS at 08:45

## 2023-08-10 RX ADMIN — PROPOFOL 50 MG: 10 INJECTION, EMULSION INTRAVENOUS at 08:25

## 2023-08-10 RX ADMIN — LIDOCAINE HYDROCHLORIDE 50 MG: 10 INJECTION, SOLUTION EPIDURAL; INFILTRATION; INTRACAUDAL at 08:24

## 2023-08-10 NOTE — H&P
History and Physical -  [facility]  Sinai Mary 80 y.o. male MRN: [id number]    HPI: Sinai Mary is a 80y.o. year old male who presents with dysphagia, change in bowel movements, brbpr. Review of Systems    Historical Information   Past Medical History:   Diagnosis Date   • Age-related macular degeneration    • Hypertension    • PVC (premature ventricular contraction)      Past Surgical History:   Procedure Laterality Date   • KNEE SURGERY  1994     Social History   Social History     Substance and Sexual Activity   Alcohol Use Yes   • Alcohol/week: 7.0 - 14.0 standard drinks of alcohol   • Types: 7 - 14 Cans of beer per week     Social History     Substance and Sexual Activity   Drug Use No     Social History     Tobacco Use   Smoking Status Former   Smokeless Tobacco Never   Tobacco Comments    Quit at 21years of age      Family History   Problem Relation Age of Onset   • Uterine cancer Mother    • Stroke Father    • Prostate cancer Father        Meds/Allergies     (Not in a hospital admission)      Allergies   Allergen Reactions   • Penicillins Other (See Comments)       Objective     /76   Pulse (!) 53   Temp (!) 96.9 °F (36.1 °C) (Temporal)   Resp 18   Ht 5' 7" (1.702 m)   Wt 81.6 kg (180 lb)   SpO2 98%   BMI 28.19 kg/m²       PHYSICAL EXAM    Gen: NAD  CV: RRR  CHEST: Clear  ABD: soft, NT/ND  EXT: no edema  Neuro: AAO      ASSESSMENT/PLAN:  This is a 80y.o. year old male here for dysphagia, change in bowel movements, brbpr.        PLAN:   Procedure: egd/colonoscopy

## 2023-08-10 NOTE — ANESTHESIA POSTPROCEDURE EVALUATION
Post-Op Assessment Note    CV Status:  Stable  Pain Score: 0    Pain management: adequate     Mental Status:  Arousable and sleepy   Hydration Status:  Euvolemic   PONV Controlled:  Controlled   Airway Patency:  Patent      Post Op Vitals Reviewed: Yes      Staff: CRNA         There were no known notable events for this encounter.     BP   127/61   Temp  98.1   Pulse 56   Resp   24   SpO2   98% ra

## 2023-08-10 NOTE — ANESTHESIA PREPROCEDURE EVALUATION
Procedure:  COLONOSCOPY  EGD    Relevant Problems   ANESTHESIA (within normal limits)      CARDIO   (+) Hypertension      GI/HEPATIC   (+) BRBPR (bright red blood per rectum)   (+) Dysphagia   (+) GERD (gastroesophageal reflux disease)        Physical Exam    Airway    Mallampati score: III  TM Distance: >3 FB  Neck ROM: full     Dental       Cardiovascular      Pulmonary      Other Findings        Anesthesia Plan  ASA Score- 2     Anesthesia Type- IV sedation with anesthesia with ASA Monitors. Additional Monitors:   Airway Plan:           Plan Factors-Exercise tolerance (METS): >4 METS. Chart reviewed. Existing labs reviewed. Patient summary reviewed. Patient is not a current smoker. Induction- intravenous. Postoperative Plan-     Informed Consent- Anesthetic plan and risks discussed with patient. I personally reviewed this patient with the CRNA. Discussed and agreed on the Anesthesia Plan with the CRNA. Yanna Mak

## 2023-08-15 PROCEDURE — 88305 TISSUE EXAM BY PATHOLOGIST: CPT | Performed by: PATHOLOGY

## 2023-08-16 NOTE — RESULT ENCOUNTER NOTE
I called this patient, I reviewed his results. The biopsies from his colonoscopy actually showed a small area of cancer. Can you please schedule him for repeat colonoscopy with me with the Dulcolax and MiraLAX bowel prep as soon as possible. He can be scheduled at [facility] or [facility] wherever needed, I would schedule him within the next 2 weeks. Please let me know if this is not possible.

## 2023-08-17 ENCOUNTER — TELEPHONE (OUTPATIENT)
Dept: GASTROENTEROLOGY | Facility: CLINIC | Age: 88
End: 2023-08-17

## 2023-08-17 NOTE — TELEPHONE ENCOUNTER
:er Dr. Red Libman pt to be scheduled within 1-2 weeks. Made him aware of date.      Scheduled date of colonoscopy (as of today): 9/5/23  Physician performing colonoscopy: Dr. Red Libman  Location of colonoscopy: [facility]  Bowel prep reviewed with patient: Miralax w/ dul emailed   Instructions reviewed with patient by: shey  Clearances: N/A - pt does see cardiologist, however, for blood pressure - answered no to cardiac list.

## 2023-08-17 NOTE — TELEPHONE ENCOUNTER
[address] [facility],[address] Assessment    Name: Yelitza Pierson  YOB: 1935  Last Height: 5' 7" (1.702 m)  Last weight: 81.6 kg (180 lb)  BMI: 28.19 kg/m²  Procedure: Colon  Diagnosis: see order  Date of procedure: 9/5/23  Prep: miralax w/ dul emailed  Responsible : yes, wife  Phone#: [phone]  Name completing form: Kavin Jack  Date form completed: 08/17/23      If the patient answers yes to any of these questions, schedule in a hospital  Are you pregnant: No  Do you rely on a wheelchair for mobility: No  Have you been diagnosed with End Stage Renal Disease (ESRD): No  Do you need oxygen during the day: No  Have you had a heart attack or stroke within the past three months: No  Have you had a seizure within the past three months: No  Have you ever been informed by anesthesia that you have a difficult airway: No  Additional Questions  Have you had any cardiac testing or are under the care of a Cardiologist (see cardiac list): No  Cardiac list:   Do you have an implanted cardiac defibrillator: No (Comment:  This patient should be scheduled in the hospital)    Have any bleeding problems, such as anemia or hemophilia (If patient has H&H result below 8, schedule in hospital.  H&H must be within 30 days of procedure): No    Had an organ transplant within the past 3 months: No    Do you have any present infections: No  Do you get short of breath when walking a few blocks: No  Have you been diagnosed with diabetes: No  Comments (provide cardiac provider information if applicable):

## 2023-08-24 NOTE — TELEPHONE ENCOUNTER
Dr. Nas Murgiua had a change in his schedule at [facility] on 9/5/23. Rescheduled pt to 9/6/23 [facility].      Scheduled date of colonoscopy (as of today): 9/6/23  Physician performing colonoscopy: Dr. Nas Murguia  [facility]   Bowel prep reviewed with patient: miralax w/ dul emailed  Instructions reviewed with patient by: ls  Clearances: n/a

## 2023-09-05 ENCOUNTER — ANESTHESIA EVENT (OUTPATIENT)
Dept: ANESTHESIOLOGY | Facility: HOSPITAL | Age: 88
End: 2023-09-05

## 2023-09-05 ENCOUNTER — ANESTHESIA (OUTPATIENT)
Dept: ANESTHESIOLOGY | Facility: HOSPITAL | Age: 88
End: 2023-09-05

## 2023-09-05 NOTE — ANESTHESIA PREPROCEDURE EVALUATION
Procedure:  PRE-OP ONLY    Relevant Problems   CARDIO   (+) Hypertension      GI/HEPATIC   (+) BRBPR (bright red blood per rectum)   (+) Dysphagia   (+) GERD (gastroesophageal reflux disease)      1/2008 Nuclear Stress Test: Normal study. EF 66%. 1/2008 Echocardiogram: EF 60%. Diastolic dysfunction as suggested by E to A reversal. Mild pulmonary insufficiency. 12/04/2014 Holter Monitor: Predominant rhythm is normal sinus rhythm with physiologic variance in heart rate. There were rare PVCs, one ventricular run, the longest of which was 4 beats at 91 beats per minute. There was one triplet. There were frequent PACs with three atrial runs, the ongest of which was 10 beats. There was no correlation of ectopic beats or arrhythmia with palpitations. Lab Results   Component Value Date    WBC 8.17 05/17/2023    HGB 13.0 05/17/2023    HCT 39.2 05/17/2023     (H) 05/17/2023     05/17/2023     No results found for: "SODIUM", "K", "CL", "CO2", "BUN", "CREATININE", "GLUC", "CALCIUM"  No results found for: "INR", "PROTIME"  No results found for: "HGBA1C"            Anesthesia Plan  ASA Score- 2     Anesthesia Type- IV sedation with anesthesia with ASA Monitors. Additional Monitors:   Airway Plan:           Plan Factors-    Chart reviewed. EKG reviewed. Existing labs reviewed. Patient summary reviewed. Induction- intravenous.     Postoperative Plan-     Informed Consent-

## 2023-09-06 ENCOUNTER — ANESTHESIA (OUTPATIENT)
Dept: GASTROENTEROLOGY | Facility: AMBULARY SURGERY CENTER | Age: 88
End: 2023-09-06

## 2023-09-06 ENCOUNTER — ANESTHESIA EVENT (OUTPATIENT)
Dept: GASTROENTEROLOGY | Facility: AMBULARY SURGERY CENTER | Age: 88
End: 2023-09-06

## 2023-09-06 ENCOUNTER — HOSPITAL ENCOUNTER (OUTPATIENT)
Dept: GASTROENTEROLOGY | Facility: AMBULARY SURGERY CENTER | Age: 88
Setting detail: OUTPATIENT SURGERY
Discharge: HOME | End: 2023-09-06
Attending: INTERNAL MEDICINE
Payer: MEDICARE

## 2023-09-06 VITALS
HEIGHT: 67 IN | HEART RATE: 52 BPM | WEIGHT: 175 LBS | OXYGEN SATURATION: 94 % | TEMPERATURE: 97 F | DIASTOLIC BLOOD PRESSURE: 71 MMHG | RESPIRATION RATE: 18 BRPM | BODY MASS INDEX: 27.47 KG/M2 | SYSTOLIC BLOOD PRESSURE: 127 MMHG

## 2023-09-06 DIAGNOSIS — D12.6 HIGH GRADE DYSPLASIA IN COLONIC ADENOMA: ICD-10-CM

## 2023-09-06 DIAGNOSIS — C20 RECTAL ADENOCARCINOMA (HCC): ICD-10-CM

## 2023-09-06 PROCEDURE — 45380 COLONOSCOPY AND BIOPSY: CPT | Performed by: INTERNAL MEDICINE

## 2023-09-06 PROCEDURE — 88305 TISSUE EXAM BY PATHOLOGIST: CPT | Performed by: STUDENT IN AN ORGANIZED HEALTH CARE EDUCATION/TRAINING PROGRAM

## 2023-09-06 PROCEDURE — 45385 COLONOSCOPY W/LESION REMOVAL: CPT | Performed by: INTERNAL MEDICINE

## 2023-09-06 PROCEDURE — 88342 IMHCHEM/IMCYTCHM 1ST ANTB: CPT | Performed by: STUDENT IN AN ORGANIZED HEALTH CARE EDUCATION/TRAINING PROGRAM

## 2023-09-06 PROCEDURE — 45381 COLONOSCOPY SUBMUCOUS NJX: CPT | Performed by: INTERNAL MEDICINE

## 2023-09-06 RX ORDER — LIDOCAINE HYDROCHLORIDE 10 MG/ML
INJECTION, SOLUTION EPIDURAL; INFILTRATION; INTRACAUDAL; PERINEURAL AS NEEDED
Status: DISCONTINUED | OUTPATIENT
Start: 2023-09-06 | End: 2023-09-06

## 2023-09-06 RX ORDER — PROPOFOL 10 MG/ML
INJECTION, EMULSION INTRAVENOUS AS NEEDED
Status: DISCONTINUED | OUTPATIENT
Start: 2023-09-06 | End: 2023-09-06

## 2023-09-06 RX ORDER — SODIUM CHLORIDE, SODIUM LACTATE, POTASSIUM CHLORIDE, CALCIUM CHLORIDE 600; 310; 30; 20 MG/100ML; MG/100ML; MG/100ML; MG/100ML
INJECTION, SOLUTION INTRAVENOUS CONTINUOUS PRN
Status: DISCONTINUED | OUTPATIENT
Start: 2023-09-06 | End: 2023-09-06

## 2023-09-06 RX ORDER — EPHEDRINE SULFATE 50 MG/ML
INJECTION INTRAVENOUS AS NEEDED
Status: DISCONTINUED | OUTPATIENT
Start: 2023-09-06 | End: 2023-09-06

## 2023-09-06 RX ORDER — PROPOFOL 10 MG/ML
INJECTION, EMULSION INTRAVENOUS CONTINUOUS PRN
Status: DISCONTINUED | OUTPATIENT
Start: 2023-09-06 | End: 2023-09-06

## 2023-09-06 RX ADMIN — PROPOFOL 100 MCG/KG/MIN: 10 INJECTION, EMULSION INTRAVENOUS at 08:35

## 2023-09-06 RX ADMIN — EPHEDRINE SULFATE 10 MG: 50 INJECTION INTRAVENOUS at 09:06

## 2023-09-06 RX ADMIN — PROPOFOL 100 MG: 10 INJECTION, EMULSION INTRAVENOUS at 08:34

## 2023-09-06 RX ADMIN — EPHEDRINE SULFATE 10 MG: 50 INJECTION INTRAVENOUS at 08:59

## 2023-09-06 RX ADMIN — LIDOCAINE HYDROCHLORIDE 30 MG: 10 INJECTION, SOLUTION EPIDURAL; INFILTRATION; INTRACAUDAL at 08:33

## 2023-09-06 RX ADMIN — SODIUM CHLORIDE, SODIUM LACTATE, POTASSIUM CHLORIDE, AND CALCIUM CHLORIDE: .6; .31; .03; .02 INJECTION, SOLUTION INTRAVENOUS at 08:23

## 2023-09-06 RX ADMIN — EPHEDRINE SULFATE 10 MG: 50 INJECTION INTRAVENOUS at 08:51

## 2023-09-06 NOTE — H&P
History and Physical -  [facility]  Td Shelton 80 y.o. male MRN: [id number]    HPI: Td Shelton is a 80y.o. year old male who presents with colon mass/carcinoma. Review of Systems    Historical Information   Past Medical History:   Diagnosis Date   • Age-related macular degeneration    • Diverticulosis    • Hypertension    • PVC (premature ventricular contraction)      Past Surgical History:   Procedure Laterality Date   • COLONOSCOPY     • EGD     • KNEE SURGERY  1994     Social History   Social History     Substance and Sexual Activity   Alcohol Use Yes   • Alcohol/week: 7.0 - 14.0 standard drinks of alcohol   • Types: 7 - 14 Cans of beer per week     Social History     Substance and Sexual Activity   Drug Use No     Social History     Tobacco Use   Smoking Status Former   Smokeless Tobacco Never   Tobacco Comments    Quit at 21years of age      Family History   Problem Relation Age of Onset   • Uterine cancer Mother    • Stroke Father    • Prostate cancer Father        Meds/Allergies     (Not in a hospital admission)      Allergies   Allergen Reactions   • Penicillins Other (See Comments)       Objective     /77   Pulse 55   Temp (!) 97.4 °F (36.3 °C) (Oral)   Resp 18   Ht 5' 7" (1.702 m)   Wt 79.4 kg (175 lb)   SpO2 99%   BMI 27.41 kg/m²       PHYSICAL EXAM    Gen: NAD  CV: RRR  CHEST: Clear  ABD: soft, NT/ND  EXT: no edema  Neuro: AAO      ASSESSMENT/PLAN:  This is a 80y.o. year old male here for colon mass/carcinoma.        PLAN:   Procedure: colonoscopy

## 2023-09-06 NOTE — ANESTHESIA PREPROCEDURE EVALUATION
Procedure:  COLONOSCOPY    Relevant Problems   ANESTHESIA (within normal limits)      CARDIO   (+) Hypertension   (+) PVC (premature ventricular contraction)      ENDO (within normal limits)      GI/HEPATIC   (+) BRBPR (bright red blood per rectum)   (+) Dysphagia   (+) GERD (gastroesophageal reflux disease)      /RENAL (within normal limits)      HEMATOLOGY (within normal limits)      NEURO/PSYCH (within normal limits)      PULMONARY (within normal limits)      Carvedilol taken this AM    1/2008 Nuclear Stress Test: Normal study. EF 66%. 1/2008 Echocardiogram: EF 60%. Diastolic dysfunction as suggested by E to A reversal. Mild pulmonary insufficiency. 12/04/2014 Holter Monitor: Predominant rhythm is normal sinus rhythm with physiologic variance in heart rate. There were rare PVCs, one ventricular run, the longest of which was 4 beats at 91 beats per minute. There was one triplet. There were frequent PACs with three atrial runs, the ongest of which was 10 beats. There was no correlation of ectopic beats or arrhythmia with palpitations. Lab Results   Component Value Date    WBC 8.17 05/17/2023    HGB 13.0 05/17/2023    HCT 39.2 05/17/2023     (H) 05/17/2023     05/17/2023     No results found for: "SODIUM", "K", "CL", "CO2", "BUN", "CREATININE", "GLUC", "CALCIUM"  No results found for: "INR", "PROTIME"  No results found for: "HGBA1C"       Physical Exam    Airway    Mallampati score: I  TM Distance: >3 FB  Neck ROM: full     Dental   No notable dental hx lower dentures,     Cardiovascular  Cardiovascular exam normal    Pulmonary  Pulmonary exam normal     Other Findings        Anesthesia Plan  ASA Score- 2     Anesthesia Type- IV sedation with anesthesia with ASA Monitors. Additional Monitors:   Airway Plan:           Plan Factors-Exercise tolerance (METS): >4 METS. Chart reviewed. EKG reviewed. Existing labs reviewed. Patient summary reviewed.             Induction- intravenous. Postoperative Plan-     Informed Consent- Anesthetic plan and risks discussed with patient. I personally reviewed this patient with the CRNA. Discussed and agreed on the Anesthesia Plan with the CRNA. Yanna Mak

## 2023-09-06 NOTE — ANESTHESIA POSTPROCEDURE EVALUATION
Post-Op Assessment Note    CV Status:  Stable  Pain Score: 0    Pain management: adequate     Mental Status:  Arousable and sleepy   Hydration Status:  Euvolemic   PONV Controlled:  Controlled   Airway Patency:  Patent      Post Op Vitals Reviewed: Yes      Staff: CRNA         No notable events documented.     /62 (09/06/23 0913)    Temp (!) 97 °F (36.1 °C) (09/06/23 0913)    Pulse (!) 48 (09/06/23 0913)   Resp 16 (09/06/23 0913)    SpO2 98 % (09/06/23 0913)

## 2023-09-11 PROCEDURE — 88342 IMHCHEM/IMCYTCHM 1ST ANTB: CPT | Performed by: STUDENT IN AN ORGANIZED HEALTH CARE EDUCATION/TRAINING PROGRAM

## 2023-09-11 PROCEDURE — 88305 TISSUE EXAM BY PATHOLOGIST: CPT | Performed by: STUDENT IN AN ORGANIZED HEALTH CARE EDUCATION/TRAINING PROGRAM

## 2023-09-12 ENCOUNTER — TELEPHONE (OUTPATIENT)
Age: 88
End: 2023-09-12

## 2023-09-12 NOTE — TELEPHONE ENCOUNTER
Patients GI provider:  Dr. Mari Domínguez    Number to return call: [phone]    Reason for call: Pt returning call regarding results. Please reach out to patient, thank you.     Scheduled procedure/appointment date if applicable: Apt/procedure n/a

## 2023-09-13 ENCOUNTER — TELEPHONE (OUTPATIENT)
Dept: GASTROENTEROLOGY | Facility: CLINIC | Age: 88
End: 2023-09-13

## 2023-09-13 NOTE — TELEPHONE ENCOUNTER
----- Message from Kolby Miguel MD sent at 9/13/2023  3:21 PM EDT -----  I called and spoke to patient, informed him that there was small amount of cancer within the polyp but most of the polyp is a benign polyp. Please schedule repeat colonoscopy in 2 months time with me. Can be done at any campus. Preferably [place].

## 2023-09-21 NOTE — TELEPHONE ENCOUNTER
Scheduled date of colonoscopy (as of today):11/16/23  Physician performing colonoscopy: Dr. Terri Buck  [facility]  Bowel prep reviewed with patient: Miralax w/ britni emailed   Instructions reviewed with patient by: shey  Clearances: n/a

## 2023-11-02 ENCOUNTER — ANESTHESIA EVENT (OUTPATIENT)
Dept: ANESTHESIOLOGY | Facility: HOSPITAL | Age: 88
End: 2023-11-02

## 2023-11-02 ENCOUNTER — ANESTHESIA (OUTPATIENT)
Dept: ANESTHESIOLOGY | Facility: HOSPITAL | Age: 88
End: 2023-11-02

## 2023-11-08 ENCOUNTER — TELEPHONE (OUTPATIENT)
Dept: GASTROENTEROLOGY | Facility: CLINIC | Age: 88
End: 2023-11-08

## 2023-11-08 NOTE — TELEPHONE ENCOUNTER
Confirmed procedure with patient. Has a , has his prep instructions and will be called day prior with arrival time.

## 2023-11-16 ENCOUNTER — ANESTHESIA (OUTPATIENT)
Dept: GASTROENTEROLOGY | Facility: AMBULARY SURGERY CENTER | Age: 88
End: 2023-11-16

## 2023-11-16 ENCOUNTER — ANESTHESIA EVENT (OUTPATIENT)
Dept: GASTROENTEROLOGY | Facility: AMBULARY SURGERY CENTER | Age: 88
End: 2023-11-16

## 2023-11-16 ENCOUNTER — HOSPITAL ENCOUNTER (OUTPATIENT)
Dept: GASTROENTEROLOGY | Facility: AMBULARY SURGERY CENTER | Age: 88
Setting detail: OUTPATIENT SURGERY
End: 2023-11-16
Attending: INTERNAL MEDICINE
Payer: MEDICARE

## 2023-11-16 VITALS
DIASTOLIC BLOOD PRESSURE: 62 MMHG | SYSTOLIC BLOOD PRESSURE: 151 MMHG | HEART RATE: 54 BPM | TEMPERATURE: 96.1 F | HEIGHT: 67 IN | BODY MASS INDEX: 28.25 KG/M2 | WEIGHT: 180 LBS | OXYGEN SATURATION: 97 % | RESPIRATION RATE: 21 BRPM

## 2023-11-16 DIAGNOSIS — D12.6 HIGH GRADE DYSPLASIA IN COLONIC ADENOMA: ICD-10-CM

## 2023-11-16 PROCEDURE — 88305 TISSUE EXAM BY PATHOLOGIST: CPT | Performed by: PATHOLOGY

## 2023-11-16 PROCEDURE — 88342 IMHCHEM/IMCYTCHM 1ST ANTB: CPT | Performed by: PATHOLOGY

## 2023-11-16 PROCEDURE — 45380 COLONOSCOPY AND BIOPSY: CPT | Performed by: INTERNAL MEDICINE

## 2023-11-16 RX ORDER — SODIUM CHLORIDE, SODIUM LACTATE, POTASSIUM CHLORIDE, CALCIUM CHLORIDE 600; 310; 30; 20 MG/100ML; MG/100ML; MG/100ML; MG/100ML
INJECTION, SOLUTION INTRAVENOUS CONTINUOUS PRN
Status: DISCONTINUED | OUTPATIENT
Start: 2023-11-16 | End: 2023-11-16

## 2023-11-16 RX ORDER — PROPOFOL 10 MG/ML
INJECTION, EMULSION INTRAVENOUS AS NEEDED
Status: DISCONTINUED | OUTPATIENT
Start: 2023-11-16 | End: 2023-11-16

## 2023-11-16 RX ORDER — EPHEDRINE SULFATE 50 MG/ML
INJECTION INTRAVENOUS AS NEEDED
Status: DISCONTINUED | OUTPATIENT
Start: 2023-11-16 | End: 2023-11-16

## 2023-11-16 RX ORDER — DIPHENOXYLATE HYDROCHLORIDE AND ATROPINE SULFATE 2.5; .025 MG/1; MG/1
1 TABLET ORAL DAILY
COMMUNITY

## 2023-11-16 RX ADMIN — PROPOFOL 50 MG: 10 INJECTION, EMULSION INTRAVENOUS at 10:09

## 2023-11-16 RX ADMIN — PROPOFOL 20 MG: 10 INJECTION, EMULSION INTRAVENOUS at 10:10

## 2023-11-16 RX ADMIN — PROPOFOL 20 MG: 10 INJECTION, EMULSION INTRAVENOUS at 10:12

## 2023-11-16 RX ADMIN — PROPOFOL 20 MG: 10 INJECTION, EMULSION INTRAVENOUS at 10:24

## 2023-11-16 RX ADMIN — SODIUM CHLORIDE, SODIUM LACTATE, POTASSIUM CHLORIDE, AND CALCIUM CHLORIDE: .6; .31; .03; .02 INJECTION, SOLUTION INTRAVENOUS at 10:04

## 2023-11-16 RX ADMIN — PROPOFOL 30 MG: 10 INJECTION, EMULSION INTRAVENOUS at 10:11

## 2023-11-16 RX ADMIN — PROPOFOL 50 MG: 10 INJECTION, EMULSION INTRAVENOUS at 10:15

## 2023-11-16 RX ADMIN — EPHEDRINE SULFATE 5 MG: 50 INJECTION INTRAVENOUS at 10:29

## 2023-11-16 RX ADMIN — PROPOFOL 40 MG: 10 INJECTION, EMULSION INTRAVENOUS at 10:14

## 2023-11-16 RX ADMIN — PROPOFOL 40 MG: 10 INJECTION, EMULSION INTRAVENOUS at 10:13

## 2023-11-16 RX ADMIN — EPHEDRINE SULFATE 10 MG: 50 INJECTION INTRAVENOUS at 10:27

## 2023-11-16 RX ADMIN — PROPOFOL 30 MG: 10 INJECTION, EMULSION INTRAVENOUS at 10:19

## 2023-11-16 NOTE — ANESTHESIA PREPROCEDURE EVALUATION
Procedure:  COLONOSCOPY    Relevant Problems   CARDIO   (+) Hypertension   (+) PVC (premature ventricular contraction)      GI/HEPATIC   (+) BRBPR (bright red blood per rectum)   (+) Dysphagia   (+) GERD (gastroesophageal reflux disease)      Past Medical History:   Diagnosis Date    Age-related macular degeneration     Diverticulosis     Hypertension     PVC (premature ventricular contraction)      Lab Results   Component Value Date    WBC 8.17 05/17/2023    HGB 13.0 05/17/2023    HCT 39.2 05/17/2023     (H) 05/17/2023     05/17/2023         Physical Exam    Airway    Mallampati score: III  TM Distance: >3 FB  Neck ROM: full     Dental   No notable dental hx     Cardiovascular  Rhythm: regular, Rate: normal    Pulmonary   Breath sounds clear to auscultation    Other Findings  Intercisor Distance > 3cm          Anesthesia Plan  ASA Score- 2     Anesthesia Type- IV sedation with anesthesia with ASA Monitors. Additional Monitors:     Airway Plan:     Comment: NPO appropriate. Discussed benefits/risks of monitored anesthetic care which involves providing a dynamic level of mild to deep sedation. Complications include awareness and/or airway obstruction/aspiration which may necessitate conversion to general anesthesia. All questions answered. Patient understands and wishes to proceed. .       Plan Factors-Exercise tolerance (METS): >4 METS. Chart reviewed. EKG reviewed. Existing labs reviewed. Induction-     Postoperative Plan- Plan for postoperative opioid use. Informed Consent- Anesthetic plan and risks discussed with patient. I personally reviewed this patient with the CRNA. Discussed and agreed on the Anesthesia Plan with the CRNA. Darylene Pipe

## 2023-11-16 NOTE — ANESTHESIA POSTPROCEDURE EVALUATION
Post-Op Assessment Note    CV Status:  Stable  Pain Score: 0    Pain management: adequate       Mental Status:  Arousable and sleepy   Hydration Status:  Euvolemic and stable   PONV Controlled:  Controlled   Airway Patency:  Patent and adequate     Post Op Vitals Reviewed: Yes      Staff: CRNA               /64 (11/16/23 1032)    Temp (!) 96.1 °F (35.6 °C) (11/16/23 1032)    Pulse (!) 54 (11/16/23 1032)   Resp (!) 99 (11/16/23 1032)    SpO2 99 % (11/16/23 1032)

## 2023-11-16 NOTE — ANESTHESIA POSTPROCEDURE EVALUATION
Post-Op Assessment Note    CV Status:  Stable    Pain management: adequate       Mental Status:  Awake   Hydration Status:  Euvolemic   PONV Controlled:  Controlled   Airway Patency:  Patent     Post Op Vitals Reviewed: Yes    No anethesia notable event occurred.     Staff: Anesthesiologist               BP      Temp     Pulse    Resp      SpO2

## 2023-11-16 NOTE — H&P
History and Physical -  [facility]  Emelia Roberto 80 y.o. male MRN: [id number]    HPI: Emelia Roberto is a 80y.o. year old male who presents with cancerous colon polyp.        Review of Systems    Historical Information   Past Medical History:   Diagnosis Date    Age-related macular degeneration     Colon polyp     Diverticulosis     Hypertension     PVC (premature ventricular contraction)      Past Surgical History:   Procedure Laterality Date    COLONOSCOPY      EGD      KNEE SURGERY       Social History   Social History     Substance and Sexual Activity   Alcohol Use Yes    Alcohol/week: 7.0 - 14.0 standard drinks of alcohol    Types: 7 - 14 Cans of beer per week    Comment: daily     Social History     Substance and Sexual Activity   Drug Use No     Social History     Tobacco Use   Smoking Status Former    Types: Cigarettes    Quit date:     Years since quittin.11   Smokeless Tobacco Never   Tobacco Comments    Quit at 21years of age      Family History   Problem Relation Age of Onset    Uterine cancer Mother     Stroke Father     Prostate cancer Father        Meds/Allergies     (Not in a hospital admission)      Allergies   Allergen Reactions    Penicillins Other (See Comments)       Objective     /59   Pulse 57   Temp 97.7 °F (36.5 °C) (Temporal)   Resp 17   Ht 5' 7" (1.702 m)   Wt 81.6 kg (180 lb)   SpO2 99%   BMI 28.19 kg/m²       PHYSICAL EXAM    Gen: NAD  CV: RRR  CHEST: Clear  ABD: soft, NT/ND  EXT: no edema  Neuro: AAO      ASSESSMENT/PLAN:  This is a 80y.o. year old male here for cancerous colon polyp    PLAN:   Procedure: colonoscopy

## 2023-11-22 PROCEDURE — 88342 IMHCHEM/IMCYTCHM 1ST ANTB: CPT | Performed by: PATHOLOGY

## 2023-11-22 PROCEDURE — 88305 TISSUE EXAM BY PATHOLOGIST: CPT | Performed by: PATHOLOGY

## 2023-11-24 ENCOUNTER — TELEPHONE (OUTPATIENT)
Age: 88
End: 2023-11-24

## 2023-11-24 DIAGNOSIS — K52.9 COLITIS: Primary | ICD-10-CM

## 2023-11-24 RX ORDER — HYDROCORTISONE 100 MG/60ML
100 SUSPENSION RECTAL
Qty: 30 ENEMA | Refills: 1 | Status: SHIPPED | OUTPATIENT
Start: 2023-11-24

## 2023-11-24 NOTE — TELEPHONE ENCOUNTER
Patients GI provider:  Dr. Stacey Alexander    Number to return call: [phone]    Reason for call: Pt calling for results of biopsy from polyp removed during colonoscopy on 11/16/2023. Transferred to [facility] from triage for further assistance.     Scheduled procedure/appointment date if applicable: N/A

## 2023-11-27 ENCOUNTER — TELEPHONE (OUTPATIENT)
Age: 88
End: 2023-11-27

## 2023-11-27 NOTE — TELEPHONE ENCOUNTER
Patients GI provider:  Dr. Lolita Wise    Number to return call: [phone]    Reason for call: Pt calling requesting to speak with someone regarding an approval for a generic brand for enema and a sooner appt    Scheduled procedure/appointment date if applicable: Apt/procedure

## 2023-11-27 NOTE — TELEPHONE ENCOUNTER
SPOKE WITH PT, OFFICE VISIT SCHEDULED, ALSO, PT WOULD LIKE TO KNOW IF THE OFFICE IS IN RECEIPT OF A FAX FROM [facility] SO THAT HIS PRESCRIPTION FOR CORTENEMA CAN BE APPROVED. PLEASE REACH OUT TO THE PT, THANK YOU.

## 2023-11-29 NOTE — TELEPHONE ENCOUNTER
Please call wife and inform her that patient should have stool studies done before he follows up in the office.   I could not find anything in the media concerning a form that was filled out for patient but it looks like they were able to get the medication they needed at a lesser cost.  Thank you

## 2023-11-29 NOTE — TELEPHONE ENCOUNTER
Called and spoke to patients wife she stated there was a form that was needed from the insurance this wasn't received but they were able to get the generic version at a lesser cost     She wants to know if he soul get the stool study done before his apt next week or if he should get it done after his apt but before he leaves for [place]

## 2023-11-30 NOTE — TELEPHONE ENCOUNTER
Called and relayed to patient and his wife they will get the stool studies done before the apt and would like a call on his Cell [phone] when the results come back

## 2023-12-04 ENCOUNTER — APPOINTMENT (OUTPATIENT)
Dept: LAB | Facility: AMBULARY SURGERY CENTER | Age: 88
End: 2023-12-04

## 2023-12-06 ENCOUNTER — APPOINTMENT (OUTPATIENT)
Dept: LAB | Facility: AMBULARY SURGERY CENTER | Age: 88
End: 2023-12-06
Payer: MEDICARE

## 2023-12-06 ENCOUNTER — OFFICE VISIT (OUTPATIENT)
Dept: GASTROENTEROLOGY | Facility: AMBULARY SURGERY CENTER | Age: 88
End: 2023-12-06
Payer: MEDICARE

## 2023-12-06 VITALS
SYSTOLIC BLOOD PRESSURE: 144 MMHG | DIASTOLIC BLOOD PRESSURE: 72 MMHG | WEIGHT: 183.4 LBS | HEART RATE: 62 BPM | OXYGEN SATURATION: 98 % | HEIGHT: 67 IN | BODY MASS INDEX: 28.79 KG/M2

## 2023-12-06 DIAGNOSIS — K52.9 INFLAMMATORY BOWEL DISEASE: Primary | ICD-10-CM

## 2023-12-06 DIAGNOSIS — K21.9 GASTROESOPHAGEAL REFLUX DISEASE WITHOUT ESOPHAGITIS: ICD-10-CM

## 2023-12-06 DIAGNOSIS — R13.19 ESOPHAGEAL DYSPHAGIA: ICD-10-CM

## 2023-12-06 DIAGNOSIS — K52.9 COLITIS: ICD-10-CM

## 2023-12-06 PROCEDURE — 99214 OFFICE O/P EST MOD 30 MIN: CPT | Performed by: PHYSICIAN ASSISTANT

## 2023-12-06 PROCEDURE — 83993 ASSAY FOR CALPROTECTIN FECAL: CPT

## 2023-12-06 RX ORDER — PANTOPRAZOLE SODIUM 40 MG/1
40 TABLET, DELAYED RELEASE ORAL DAILY
Qty: 90 TABLET | Refills: 3 | Status: SHIPPED | OUTPATIENT
Start: 2023-12-06

## 2023-12-06 RX ORDER — CLOTRIMAZOLE AND BETAMETHASONE DIPROPIONATE 10; .64 MG/G; MG/G
CREAM TOPICAL
COMMUNITY
Start: 2023-11-21

## 2023-12-06 NOTE — PATIENT INSTRUCTIONS
Scheduled date of colonoscopy (as of today):  05/08/24  Physician performing colonoscopy:  Dr Margarita Plaza  Location of colonoscopy: [facility]  Bowel prep reviewed with patient: Miralax/Dulcolax  Instructions reviewed with patient by: Yoon RIDDLE   Clearances: n/a

## 2023-12-06 NOTE — PROGRESS NOTES
Follow-up Note -  [facility]  Prema Thomas 1935 80 y.o. male         ASSESSMENT & PLAN:    #1.  History of colon polyp with high-grade dysplasia and focus of adenocarcinoma status post piecemeal resection with saline lift in September, no evidence of remaining polyp on follow-up colonoscopy in November. Also shows signs of inflammatory bowel disease on biopsy and endoscopy, he does report some clinical improvement with hydrocortisone enemas at this time      -We will discuss with attending, regarding timeframe of completing hydrocortisone enemas, can consider completing prescribed 2-month course and assessing response on follow-up colonoscopy    -We will plan for colonoscopy in 6 months after last exam as previously recommended, he will be due in May    -Follow-up on pending stool calprotectin, sample was recently submitted    -Continue pantoprazole, though given the absence of Hampton's I suggested he could take this every other day for 3 to 4 weeks and gradually taper off if he continues to do well clinically    -I advised patient regarding signs and symptoms to look out for which may reveal developing ulcerative colitis flareup, such as rectal bleeding, tenesmus, diarrhea, stool urgency, unintentional weight loss    Reason: Follow-up; previous colon biopsy findings suggestive of rectal cancer, ulcerative colitis    HPI: 55-year-old male with history of hypertension who presents for follow-up, he had had a few colonoscopies earlier this year, first in August with regards to bright red blood per rectum, noting some diverticulosis and some edema in the proximal rectum which was biopsied, found to have characteristics of tubular adenoma with high-grade dysplasia and a minute fragment of adenocarcinoma.   Follow-up colonoscopy September 6 noted evidence of chronic active colitis involving the sigmoid colon consistent with scad or inflammatory bowel disease, also smooth polyp was noted in Return to the Emergency Department for symptoms including but not limited to: worsening symptoms, shortness of breath or chest pain, vomiting with inability to hold down fluids, fevers greater than 100.4°F, passing out/fainting/unconsciousness, or other concerning symptoms.   the rectum which was removed in piecemeal fashion with saline left, again with biopsies suggestive of adenomatous polyp with a small focus of adenocarcinoma. He had a third colonoscopy to follow-up about 3 weeks ago on 11/16, showing what appeared to be more active inflammation extending from the rectum to about 25 cm from the anal verge, biopsies were also taken around the tattoo site from previous polypectomy and these did not show any evidence of adenoma or malignancy; otherwise no colonic biopsies and the apparent area of inflammation did appear consistent with UC. He was started on hydrocortisone enemas. He says that the enemas do seem to be helping, he is not having any rectal bleeding and is not having any problems with tenesmus/incomplete evacuation. Bowel habits are generally regular at this time. Acid reflux appears well managed with PPI therapy currently, his last 2 EGDs showed no evidence of Hampton's. He relates a remote history of being told about what he thinks was ulcerative colitis about 20 years ago but does not remember ever being put on any kind of long-term maintenance therapy for that. REVIEW OF SYSTEMS:      CONSTITUTIONAL: Denies any fever, chills, or rigors. Good appetite, and no recent weight loss. HEENT: No earache or tinnitus. Denies hearing loss or visual disturbances. CARDIOVASCULAR: No chest pain or palpitations. RESPIRATORY: Denies any cough, hemoptysis, shortness of breath or dyspnea on exertion. GASTROINTESTINAL: As noted in the History of Present Illness. GENITOURINARY: No problems with urination. Denies any hematuria or dysuria. NEUROLOGIC: No dizziness or vertigo, denies headaches. MUSCULOSKELETAL: Denies any muscle or joint pain. SKIN: Denies skin rashes or itching. ENDOCRINE: Denies excessive thirst. Denies intolerance to heat or cold. PSYCHOSOCIAL: Denies depression or anxiety. Denies any recent memory loss.      Past Medical History:   Diagnosis Date Age-related macular degeneration     Colon polyp     Diverticulosis     Hypertension     PVC (premature ventricular contraction)       Past Surgical History:   Procedure Laterality Date    COLONOSCOPY      EGD      KNEE SURGERY       Social History     Socioeconomic History    Marital status: /Civil Union     Spouse name: Not on file    Number of children: Not on file    Years of education: Not on file    Highest education level: Not on file   Occupational History    Not on file   Tobacco Use    Smoking status: Former     Types: Cigarettes     Quit date:      Years since quittin.9    Smokeless tobacco: Never    Tobacco comments:     Quit at 21years of age    Vaping Use    Vaping Use: Never used   Substance and Sexual Activity    Alcohol use:  Yes     Alcohol/week: 7.0 - 14.0 standard drinks of alcohol     Types: 7 - 14 Cans of beer per week     Comment: daily    Drug use: No    Sexual activity: Not on file   Other Topics Concern    Not on file   Social History Narrative    Not on file     Social Determinants of Health     Financial Resource Strain: Not on file   Food Insecurity: Not on file   Transportation Needs: Not on file   Physical Activity: Not on file   Stress: Not on file   Social Connections: Not on file   Intimate Partner Violence: Not on file   Housing Stability: Not on file     Family History   Problem Relation Age of Onset    Uterine cancer Mother     Stroke Father     Prostate cancer Father      Penicillins  Current Outpatient Medications   Medication Sig Dispense Refill    amLODIPine (NORVASC) 10 mg tablet Take 10 mg by mouth daily      carvedilol (COREG) 12.5 mg tablet Take 12.5 mg by mouth 2 (two) times a day with meals      clotrimazole-betamethasone (LOTRISONE) 1-0.05 % cream APPLY CREAM TOPICALLY TWICE DAILY TO THE AFFECTED AND SURROUNDING AREAS OF SKIN IN THE MORNING AND EVENING FOR 2 WEEKS      hydrocortisone (CORTENEMA) 100 mg/60 mL enema Insert 60 mL (100 mg total) into the rectum daily at bedtime 30 enema 1    Krill Oil 1000 MG CAPS Take 1 tablet by mouth daily      Multiple Vitamins-Minerals (ICAPS AREDS 2 PO) Take 1 capsule by mouth daily      pantoprazole (PROTONIX) 40 mg tablet Take 1 tablet (40 mg total) by mouth daily 90 tablet 3    multivitamin (THERAGRAN) TABS Take 1 tablet by mouth daily (Patient not taking: Reported on 12/6/2023)       No current facility-administered medications for this visit. Blood pressure 144/72, pulse 62, height 5' 7" (1.702 m), weight 83.2 kg (183 lb 6.4 oz), SpO2 98 %. PHYSICAL EXAM:      General Appearance:   Alert, cooperative, no distress, appears stated age    HEENT:   Normocephalic, atraumatic, anicteric. Neck:  Supple, symmetrical, trachea midline, no adenopathy;    thyroid: no enlargement/tenderness/nodules; no carotid  bruit or JVD    Lungs:   Clear to auscultation bilaterally; no rales, rhonchi or wheezing; respirations unlabored    Heart[de-identified]   S1 and S2 normal; regular rate and rhythm; no murmur, rub, or gallop. Abdomen:   Soft, non-tender, non-distended; normal bowel sounds; no masses, no organomegaly    Extremities: No edema, erythema, wounds, rashes   Rectal:   Deferred                      Lab Results   Component Value Date    WBC 8.17 05/17/2023    HGB 13.0 05/17/2023    HCT 39.2 05/17/2023     (H) 05/17/2023     05/17/2023     No results found for: "GLUCOSE", "CALCIUM", "NA", "K", "CO2", "CL", "BUN", "CREATININE"  No results found for: "ALT", "AST", "GGT", "ALKPHOS", "BILITOT"  No results found for: "INR", "PROTIME"    Colonoscopy    Result Date: 11/16/2023  Impression: Tattoo seen from recent colonoscopy. Difficult identify area of prior polypectomy site patient has new active inflammatory changes from the rectum to approximate 25 cm from the anal verge with granular erythematous mucosa consistent with inflammatory bowel disease. Biopsies taken. There was rectal sparing.   Multiple biopsies were taken from around the tattoo area though no discrete lesion was seen.  Normal terminal ileum Pandiverticulosis RECOMMENDATION:  Repeat colonoscopy in 6 months  Personal history of colon cancer  Discharge home Resume regular diet Follow-up biopsy results Consider treatment for underlying inflammatory bowel disease Call with any abdominal pain, bleeding, fevers  Sal Sam MD

## 2023-12-08 LAB — CALPROTECTIN STL-MCNT: 88 UG/G (ref 0–120)

## 2023-12-15 ENCOUNTER — TELEPHONE (OUTPATIENT)
Dept: GASTROENTEROLOGY | Facility: CLINIC | Age: 88
End: 2023-12-15

## 2023-12-15 NOTE — TELEPHONE ENCOUNTER
Called and relayed recommendation to patient he stated he will continue taking this for 5 out of 7 days but the cost of the medication is $270

## 2023-12-15 NOTE — TELEPHONE ENCOUNTER
----- Message from Maryazmin Kempmer sent at 12/15/2023  9:37 AM EST -----  Just an FYI.  ----- Message -----  From: Alyson Grace  Sent: 12/15/2023   9:24 AM EST  To: Mar Lazo    I have attempted to call patient on 2 occasions and left messages.  Patient has not called back.   ----- Message -----  From: Mar Lazo  Sent: 12/8/2023   1:42 PM EST  To: [facility]    Can a nurse please assist?  Thank you     ----- Message -----  From: Sun Herring PA-C  Sent: 12/8/2023  12:59 PM EST  To: [facility]    Can we please contact patient and let him know that I spoke with Dr. Melendez and we recommend him to continue the Cortenema regularly for the meantime until his colonoscopy, if he is doing well he can take the enema 5 out of 7 nights of the week.  Thank you.      ----- Message -----  From: aSmson Melendez MD  Sent: 12/7/2023   9:35 PM EST  To: Sun Herring PA-C    He should continue until his next colonoscopy. He can use it 5/7 nights per week  ----- Message -----  From: Sun Herring PA-C  Sent: 12/6/2023  12:32 PM EST  To: Samson Melendez MD    Hi Dr. Melendez, I just saw this patient in the office today, he seems to be doing better on the cortenema, we were planning to do colonoscopy for him around May timeframe as that will be 6 months after his last one.  Did you wish to continue the Cortenema for the entire meantime or just for 2 months as prescribed?  He says he is going to be staying in [place] for the winter.  We will follow-up on his pending stool calprotectin too.  Thanks

## 2023-12-18 NOTE — TELEPHONE ENCOUNTER
Pt called in inquiring about the Cortenema. Pt stated he was waiting for a call back to see if there may be an alternative as the med is $270. Please reach out to pt to discuss further.

## 2023-12-21 NOTE — TELEPHONE ENCOUNTER
Please advise if there is another medication patient can be prescribed or a OTC medication patient can use since the cost of the Cortenema/Hydrocortisone medication is so expensive     Thank you!

## 2023-12-29 NOTE — TELEPHONE ENCOUNTER
Pt calling in requesting to speak with Jenise directly regarding medication, he is asking for a call back at [phone].

## 2023-12-29 NOTE — TELEPHONE ENCOUNTER
Patients GI provider:  SOTO Herring    Number to return call: [phone]    Reason for call: Pt calling again a bit frustrated that no one has called him back and this problem has not been resolved. Pt asking if Jenise can call him back at the above number.      Scheduled procedure/appointment date if applicable: Procedure 5/8/24

## 2024-01-02 ENCOUNTER — NURSE TRIAGE (OUTPATIENT)
Age: 89
End: 2024-01-02

## 2024-01-02 DIAGNOSIS — K52.9 COLITIS: Primary | ICD-10-CM

## 2024-01-02 RX ORDER — MESALAMINE 800 MG/1
1600 TABLET, DELAYED RELEASE ORAL 2 TIMES DAILY
Qty: 120 TABLET | Refills: 3 | Status: SHIPPED | OUTPATIENT
Start: 2024-01-02 | End: 2024-01-04

## 2024-01-02 RX ORDER — MESALAMINE 4 G/60ML
4 SUSPENSION RECTAL
Qty: 30 ENEMA | Refills: 3 | Status: SHIPPED | OUTPATIENT
Start: 2024-01-02 | End: 2024-01-05 | Stop reason: SDUPTHER

## 2024-01-02 NOTE — TELEPHONE ENCOUNTER
Patito calling back in, enema is $230 a month and the tablets are not covered by insurance unless prior auth is done.     Please start authorization for mesalamine (Asacol).

## 2024-01-02 NOTE — TELEPHONE ENCOUNTER
Pts wife calling in, reports the pharmacy said one of the medications were not covered and one was really expensive. Pt is also in [place] right now- I explained we can not send a medication to a state we do not have licensing in.     She will call back to find out how much the medications are and if the enema is what is not covered by insurance. I advised her to ask pharmacy if any medication needed prior auth or if they know on their end what medications would be covered by their insurance. She will call back with information.

## 2024-01-02 NOTE — TELEPHONE ENCOUNTER
"Reason for Disposition   Nursing judgment    Answer Assessment - Initial Assessment Questions  1. REASON FOR CALL or QUESTION: \"What is your reason for calling today?\" or \"How can I best help you?\" or \"What question do you have that I can help answer?\"         Pt. Calling in waiting on response from someone regarding not being able to continue with Cortenema because of oop cost, last conversation with BAY Nails  on 12/21/23 was suggesting mesalamine enema, advised pt I would reach out to provider for further recommendation and advisement, pt. Spouse asking from someone to call her back at [phone], pt. Is currently in [place]    Protocols used: Information Only Call - No Triage-ADULT-[place]    "

## 2024-01-03 NOTE — TELEPHONE ENCOUNTER
PA for mesalamine    Submitted via  [x]CMM-KEY [id number]   []Surescripts-Case ID #   []Faxed to plan   []Other website   []Phone call Case ID #     Office notes sent, clinical questions answered. Awaiting determination

## 2024-01-03 NOTE — TELEPHONE ENCOUNTER
No current pharmacy benefits in chart  Need to call pharmacy    Called [facility]    [address] [id number]  [address]  [id number]  [facility]   ID [id number]  [employer]

## 2024-01-03 NOTE — TELEPHONE ENCOUNTER
PA for Mesalamine Denied    Reason:(Screenshot if applicable)          Message sent to clinical pool Yes    Denial letter scanned into Media Yes    Appeal started No

## 2024-01-04 DIAGNOSIS — K52.9 INFLAMMATORY BOWEL DISEASE: Primary | ICD-10-CM

## 2024-01-04 RX ORDER — BALSALAZIDE DISODIUM 750 MG/1
2250 CAPSULE ORAL 3 TIMES DAILY
Qty: 270 CAPSULE | Refills: 5 | Status: SHIPPED | OUTPATIENT
Start: 2024-01-04

## 2024-01-05 DIAGNOSIS — K52.9 COLITIS: ICD-10-CM

## 2024-01-05 RX ORDER — MESALAMINE 4 G/60ML
4 SUSPENSION RECTAL
Qty: 30 ENEMA | Refills: 3 | Status: SHIPPED | OUTPATIENT
Start: 2024-01-05

## 2024-01-05 NOTE — TELEPHONE ENCOUNTER
Called and relayed to patient they will call the office back if there is any issues     Called pharmacy for the enema they stated no auth Is required but there is a high copay of 235$ believes this is due to the new year deductible       Will look for some type of assistance if unable to obtain will reach out to the provider pool

## 2024-01-08 RX ORDER — MESALAMINE 4 G/60ML
4 SUSPENSION RECTAL
Qty: 30 ENEMA | Refills: 3 | Status: SHIPPED | OUTPATIENT
Start: 2024-01-08

## 2024-01-11 NOTE — TELEPHONE ENCOUNTER
Patients GI provider:  PITER Herring     Number to return call: [phone]  Reason for call: Pt's wife returning Mariia Eugene's call. Patient and wife can be reached at the number above, thank you.    Scheduled procedure/appointment date if applicable: Apt/procedure 5/8/2024

## 2024-04-24 ENCOUNTER — ANESTHESIA (OUTPATIENT)
Dept: ANESTHESIOLOGY | Facility: HOSPITAL | Age: 89
End: 2024-04-24

## 2024-04-24 ENCOUNTER — ANESTHESIA EVENT (OUTPATIENT)
Dept: ANESTHESIOLOGY | Facility: HOSPITAL | Age: 89
End: 2024-04-24

## 2024-05-01 ENCOUNTER — TELEPHONE (OUTPATIENT)
Dept: GASTROENTEROLOGY | Facility: CLINIC | Age: 89
End: 2024-05-01

## 2024-05-01 NOTE — TELEPHONE ENCOUNTER
Spoke with patient confirming his 5/8 colonoscopy with Dr. Melendez. He has his  arranged and his prep instructions. He will be called day before with his arrival time.

## 2024-05-07 ENCOUNTER — TELEPHONE (OUTPATIENT)
Dept: GASTROENTEROLOGY | Facility: AMBULARY SURGERY CENTER | Age: 89
End: 2024-05-07

## 2024-05-07 RX ORDER — SODIUM CHLORIDE, SODIUM LACTATE, POTASSIUM CHLORIDE, CALCIUM CHLORIDE 600; 310; 30; 20 MG/100ML; MG/100ML; MG/100ML; MG/100ML
50 INJECTION, SOLUTION INTRAVENOUS CONTINUOUS
Status: CANCELLED | OUTPATIENT
Start: 2024-05-07

## 2024-05-07 NOTE — TELEPHONE ENCOUNTER
Patients GI provider:       Number to return call: ( [phone]    Reason for call: Pt calling with dizziness this morning. Pt wants to know if he should continue with colon prep today. Pt asking for call back     Scheduled procedure/appointment date if applicable: Apt/procedure

## 2024-05-07 NOTE — TELEPHONE ENCOUNTER
Pt woke up this morning with dizziness this is an ongoing issue that generally goes away quickly. He states he feels fine. He lives at home with his wife. Advised to continue with prep and before getting up to sit up for a bit before standing up. Pt agreeable.

## 2024-05-08 ENCOUNTER — ANESTHESIA (OUTPATIENT)
Dept: GASTROENTEROLOGY | Facility: AMBULARY SURGERY CENTER | Age: 89
End: 2024-05-08

## 2024-05-08 ENCOUNTER — HOSPITAL ENCOUNTER (OUTPATIENT)
Dept: GASTROENTEROLOGY | Facility: AMBULARY SURGERY CENTER | Age: 89
Setting detail: OUTPATIENT SURGERY
Discharge: HOME | End: 2024-05-08
Payer: MEDICARE

## 2024-05-08 ENCOUNTER — ANESTHESIA EVENT (OUTPATIENT)
Dept: GASTROENTEROLOGY | Facility: AMBULARY SURGERY CENTER | Age: 89
End: 2024-05-08

## 2024-05-08 VITALS
TEMPERATURE: 98 F | DIASTOLIC BLOOD PRESSURE: 55 MMHG | RESPIRATION RATE: 17 BRPM | HEART RATE: 62 BPM | SYSTOLIC BLOOD PRESSURE: 110 MMHG | HEIGHT: 68 IN | BODY MASS INDEX: 26.83 KG/M2 | OXYGEN SATURATION: 96 % | WEIGHT: 177 LBS

## 2024-05-08 DIAGNOSIS — K52.9 INFLAMMATORY BOWEL DISEASE: ICD-10-CM

## 2024-05-08 PROCEDURE — 88305 TISSUE EXAM BY PATHOLOGIST: CPT | Performed by: PATHOLOGY

## 2024-05-08 PROCEDURE — 45380 COLONOSCOPY AND BIOPSY: CPT | Performed by: INTERNAL MEDICINE

## 2024-05-08 RX ORDER — PROPOFOL 10 MG/ML
INJECTION, EMULSION INTRAVENOUS AS NEEDED
Status: DISCONTINUED | OUTPATIENT
Start: 2024-05-08 | End: 2024-05-08

## 2024-05-08 RX ORDER — SODIUM CHLORIDE, SODIUM LACTATE, POTASSIUM CHLORIDE, CALCIUM CHLORIDE 600; 310; 30; 20 MG/100ML; MG/100ML; MG/100ML; MG/100ML
INJECTION, SOLUTION INTRAVENOUS CONTINUOUS PRN
Status: DISCONTINUED | OUTPATIENT
Start: 2024-05-08 | End: 2024-05-08

## 2024-05-08 RX ORDER — LIDOCAINE HYDROCHLORIDE 10 MG/ML
INJECTION, SOLUTION EPIDURAL; INFILTRATION; INTRACAUDAL; PERINEURAL AS NEEDED
Status: DISCONTINUED | OUTPATIENT
Start: 2024-05-08 | End: 2024-05-08

## 2024-05-08 RX ORDER — BALSALAZIDE DISODIUM 750 MG/1
1500 CAPSULE ORAL 2 TIMES DAILY
Qty: 360 CAPSULE | Refills: 3 | Status: SHIPPED | OUTPATIENT
Start: 2024-05-08

## 2024-05-08 RX ORDER — SODIUM CHLORIDE, SODIUM LACTATE, POTASSIUM CHLORIDE, CALCIUM CHLORIDE 600; 310; 30; 20 MG/100ML; MG/100ML; MG/100ML; MG/100ML
50 INJECTION, SOLUTION INTRAVENOUS CONTINUOUS
Status: DISCONTINUED | OUTPATIENT
Start: 2024-05-08 | End: 2024-05-12 | Stop reason: HOSPADM

## 2024-05-08 RX ADMIN — PROPOFOL 20 MG: 10 INJECTION, EMULSION INTRAVENOUS at 08:03

## 2024-05-08 RX ADMIN — PROPOFOL 20 MG: 10 INJECTION, EMULSION INTRAVENOUS at 07:49

## 2024-05-08 RX ADMIN — PROPOFOL 30 MG: 10 INJECTION, EMULSION INTRAVENOUS at 07:52

## 2024-05-08 RX ADMIN — PROPOFOL 30 MG: 10 INJECTION, EMULSION INTRAVENOUS at 07:58

## 2024-05-08 RX ADMIN — PROPOFOL 30 MG: 10 INJECTION, EMULSION INTRAVENOUS at 07:44

## 2024-05-08 RX ADMIN — PROPOFOL 30 MG: 10 INJECTION, EMULSION INTRAVENOUS at 07:47

## 2024-05-08 RX ADMIN — PROPOFOL 70 MG: 10 INJECTION, EMULSION INTRAVENOUS at 07:43

## 2024-05-08 RX ADMIN — SODIUM CHLORIDE, SODIUM LACTATE, POTASSIUM CHLORIDE, AND CALCIUM CHLORIDE: .6; .31; .03; .02 INJECTION, SOLUTION INTRAVENOUS at 07:20

## 2024-05-08 RX ADMIN — LIDOCAINE HYDROCHLORIDE 50 MG: 10 INJECTION, SOLUTION EPIDURAL; INFILTRATION; INTRACAUDAL; PERINEURAL at 07:43

## 2024-05-08 RX ADMIN — PROPOFOL 50 MG: 10 INJECTION, EMULSION INTRAVENOUS at 07:56

## 2024-05-08 RX ADMIN — PROPOFOL 20 MG: 10 INJECTION, EMULSION INTRAVENOUS at 07:45

## 2024-05-08 NOTE — ANESTHESIA POSTPROCEDURE EVALUATION
Post-Op Assessment Note    CV Status:  Stable  Pain Score: 0    Pain management: adequate       Mental Status:  Alert and awake   Hydration Status:  Euvolemic   PONV Controlled:  Controlled   Airway Patency:  Patent     Post Op Vitals Reviewed: Yes    No anethesia notable event occurred.    Staff: LEONARAD               BP 93/51 (05/08/24 0810)    Temp 98 °F (36.7 °C) (05/08/24 0810)    Pulse 56 (05/08/24 0810)   Resp 17 (05/08/24 0810)    SpO2 94 % (05/08/24 0810)

## 2024-05-08 NOTE — H&P
"History and Physical -  [facility]  Stevo Loo 89 y.o. male MRN: [id number]    HPI: Stevo Loo is a 89 y.o. year old male who presents with colitis and polyps.       Review of Systems    Historical Information   Past Medical History:   Diagnosis Date    Age-related macular degeneration     Colon polyp     Diverticulosis     Hypertension     PVC (premature ventricular contraction)      Past Surgical History:   Procedure Laterality Date    COLONOSCOPY      EGD      KNEE SURGERY Right 1994    menisectomy     Social History   Social History     Substance and Sexual Activity   Alcohol Use Yes    Alcohol/week: 7.0 - 14.0 standard drinks of alcohol    Types: 7 - 14 Cans of beer per week    Comment: daily     Social History     Substance and Sexual Activity   Drug Use No     Social History     Tobacco Use   Smoking Status Former    Current packs/day: 0.00    Types: Cigarettes    Quit date:     Years since quittin.3   Smokeless Tobacco Never   Tobacco Comments    Quit at 23 years of age      Family History   Problem Relation Age of Onset    Uterine cancer Mother     Stroke Father     Prostate cancer Father        Meds/Allergies     (Not in a hospital admission)      Allergies   Allergen Reactions    Penicillins Other (See Comments)       Objective     /68   Pulse 61   Temp (!) 96.8 °F (36 °C) (Temporal)   Resp 18   Ht 5' 8\" (1.727 m)   Wt 80.3 kg (177 lb)   SpO2 99%   BMI 26.91 kg/m²       PHYSICAL EXAM    Gen: NAD  CV: RRR  CHEST: Clear  ABD: soft, NT/ND  EXT: no edema  Neuro: AAO      ASSESSMENT/PLAN:  This is a 89 y.o. year old male here for colitis and polyps.     PLAN:   Procedure: colonoscopy      "

## 2024-05-08 NOTE — ANESTHESIA PREPROCEDURE EVALUATION
"Procedure:  COLONOSCOPY    Relevant Problems   CARDIO   (+) Hypertension   (+) PVC (premature ventricular contraction)      GI/HEPATIC   (+) BRBPR (bright red blood per rectum)   (+) Dysphagia   (+) GERD (gastroesophageal reflux disease)        Physical Exam    Airway    Mallampati score: II  TM Distance: >3 FB  Neck ROM: full     Dental    lower dentures    Cardiovascular  Cardiovascular exam normal    Pulmonary  Pulmonary exam normal     Other Findings        Anesthesia Plan  ASA Score- 3     Anesthesia Type- IV sedation with anesthesia with ASA Monitors.         Additional Monitors:     Airway Plan:            Plan Factors-Exercise tolerance (METS): <4 METS.    Chart reviewed. EKG reviewed.  Existing labs reviewed. Patient summary reviewed.    Patient is not a current smoker. Patient not instructed to abstain from smoking on day of procedure. Patient did not smoke on day of surgery.            Induction-     Postoperative Plan-     Informed Consent- Anesthetic plan and risks discussed with patient.  I personally reviewed this patient with the CRNA. Discussed and agreed on the Anesthesia Plan with the CRNA..                No results found for: \"HGBA1C\"    Lab Results   Component Value Date    K 4.7 07/06/2022     07/06/2022    CO2 29 07/06/2022    BUN 20 07/06/2022    CREATININE 1.08 07/06/2022    CALCIUM 9.4 07/06/2022    AST 13 07/06/2022    ALT 12 07/06/2022    ALKPHOS 64 07/06/2022       Lab Results   Component Value Date    WBC 8.17 05/17/2023    HGB 13.0 05/17/2023    HCT 39.2 05/17/2023     (H) 05/17/2023     05/17/2023 /2008 Nuclear Stress Test: Normal study. EF 66%.  1/2008 Echocardiogram: EF 60%. Diastolic dysfunction as suggested by E to A reversal. Mild pulmonary insufficiency.  12/04/2014 Holter Monitor: Predominant rhythm is normal sinus rhythm with physiologic variance in heart rate. There were rare PVCs, one ventricular run, the longest of which was 4 beats at 91 beats per " minute. There was one triplet. There were frequent PACs with three atrial runs, the ongest of which was 10 beats. There was no correlation of ectopic beats or arrhythmia with palpitations.     Sees [facility] cardiology once a year

## 2024-05-13 PROCEDURE — 88305 TISSUE EXAM BY PATHOLOGIST: CPT | Performed by: PATHOLOGY

## 2024-11-04 ENCOUNTER — OFFICE VISIT (OUTPATIENT)
Dept: GASTROENTEROLOGY | Facility: AMBULARY SURGERY CENTER | Age: 89
End: 2024-11-04
Payer: MEDICARE

## 2024-11-04 VITALS
HEIGHT: 68 IN | HEART RATE: 49 BPM | OXYGEN SATURATION: 99 % | SYSTOLIC BLOOD PRESSURE: 170 MMHG | BODY MASS INDEX: 28.22 KG/M2 | DIASTOLIC BLOOD PRESSURE: 82 MMHG | WEIGHT: 186.2 LBS

## 2024-11-04 DIAGNOSIS — K59.00 CONSTIPATION, UNSPECIFIED CONSTIPATION TYPE: ICD-10-CM

## 2024-11-04 DIAGNOSIS — C18.9 ADENOCARCINOMA OF COLON (HCC): ICD-10-CM

## 2024-11-04 DIAGNOSIS — K52.9 COLITIS: Primary | ICD-10-CM

## 2024-11-04 PROCEDURE — 99213 OFFICE O/P EST LOW 20 MIN: CPT | Performed by: PHYSICIAN ASSISTANT

## 2024-11-04 NOTE — ASSESSMENT & PLAN NOTE
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

## 2024-11-04 NOTE — LETTER
2024     Lindsey Oneil MD  [address] [place] [id number]    Patient: Stevo Loo   YOB: 1935   Date of Visit: 2024       Dear Dr. Oneil:    Thank you for referring Stevo Loo to me for evaluation. Below are my notes for this consultation.    If you have questions, please do not hesitate to call me. I look forward to following your patient along with you.         Sincerely,        Eduar Santana PA-C        CC: No Recipients    Eduar Santana PA-C  2024  1:39 PM  Incomplete  Ambulatory Visit  Name: Stevo oLo      : 1935      MRN: [id number]  Encounter Provider: Eduar Santana PA-C  Encounter Date: 2024   Encounter department: [facility] [place]    Assessment & Plan  Colitis  - Felt to be segmental colitis associated with diverticulosis (SCAD)  -Treated initially with Hydrocort enemas  -Currently on balsalazide (750mg capsules) 1500 mg twice daily  -Resolved at this time and asymptomatic  -Last colonoscopy May 2024 no further disease on biopsies  -Will have him follow-up in a month and decide at that time if repeat colonoscopy will be done.       Adenocarcinoma of colon (HCC)  -Small focus noted on 2023 colonoscopy  -Repeat colonoscopy is 2023 and May 2024 no further adenocarcinoma       Constipation, unspecified constipation type  -Stable on Metamucil and stool softener         History of Present Illness    Stevo Loo is a 89 y.o. male with history of hypertension who presents for follow-up, he had had a few colonoscopies earlier this year, first in August with regards to bright red blood per rectum, noting some diverticulosis and some edema in the proximal rectum which was biopsied, found to have characteristics of tubular adenoma with high-grade dysplasia and a minute fragment of adenocarcinoma.  Follow-up colonoscopy  noted evidence of  chronic active colitis involving the sigmoid colon consistent with scad or inflammatory bowel disease, also smooth polyp was noted in the rectum which was removed in piecemeal fashion with saline left, again with biopsies suggestive of adenomatous polyp with a small focus of adenocarcinoma.  He had a third colonoscopy to follow-up about 3 weeks ago on 11/16, showing what appeared to be more active inflammation extending from the rectum to about 25 cm from the anal verge, biopsies were also taken around the tattoo site from previous polypectomy and these did not show any evidence of adenoma or malignancy; otherwise no colonic biopsies and the apparent area of inflammation did appear consistent with UC.  He was started on hydrocortisone enemas.  Which initially did help.  He had repeat colonoscopy on May 8, 2024 with all normal-appearing mucosa.  Biopsies showed no further adenocarcinoma and certainly no chronic inflammation.    He is currently doing well and offers no complaints.  He reports occasional constipation for which she takes Metamucil Gummies and a stool softener as needed.  He denies any loose and frequent stool or black or bloody stools.      Review of Systems   Constitutional:  Negative for activity change, appetite change, chills, diaphoresis, fatigue and unexpected weight change.   HENT:  Negative for mouth sores, rhinorrhea, sore throat and trouble swallowing.    Eyes:  Negative for discharge, redness and visual disturbance.   Respiratory:  Negative for apnea, cough, choking, chest tightness and shortness of breath.    Cardiovascular:  Negative for chest pain, palpitations and leg swelling.   Gastrointestinal:  Negative for abdominal distention, abdominal pain, anal bleeding, blood in stool, constipation, diarrhea, nausea, rectal pain and vomiting.   Genitourinary:  Negative for difficulty urinating, dysuria, frequency and hematuria.   Musculoskeletal:  Negative for arthralgias, back pain, gait  "problem, joint swelling and myalgias.   Skin:  Negative for color change, pallor and rash.   Allergic/Immunologic: Negative for environmental allergies and food allergies.   Neurological:  Negative for dizziness, tremors, weakness, light-headedness, numbness and headaches.   Psychiatric/Behavioral:  Negative for agitation, behavioral problems, confusion and sleep disturbance. The patient is not nervous/anxious.            Objective    /82 (BP Location: Left arm, Patient Position: Sitting, Cuff Size: Standard)   Pulse (!) 49   Ht 5' 8\" (1.727 m)   Wt 84.5 kg (186 lb 3.2 oz)   SpO2 99%   BMI 28.31 kg/m²     Physical Exam  Constitutional:       General: He is not in acute distress.     Appearance: Normal appearance. He is not ill-appearing.   HENT:      Head: Normocephalic and atraumatic.   Eyes:      General: No scleral icterus.     Conjunctiva/sclera: Conjunctivae normal.   Cardiovascular:      Rate and Rhythm: Normal rate and regular rhythm.   Pulmonary:      Effort: Pulmonary effort is normal. No respiratory distress.      Breath sounds: Normal breath sounds.   Abdominal:      General: Bowel sounds are normal. There is no distension.      Palpations: Abdomen is soft.      Tenderness: There is no abdominal tenderness. There is no guarding.   Skin:     General: Skin is warm and dry.   Neurological:      Mental Status: He is alert and oriented to person, place, and time.   Psychiatric:         Mood and Affect: Mood normal.         Behavior: Behavior normal.           Eduar Santana PA-C  [facility] - [facility]      Eduar Santana PA-C  2024  1:34 PM  Sign when Signing Visit  Ambulatory Visit  Name: Stevo Loo      : 1935      MRN: [id number]  Encounter Provider: Eduar Santnaa PA-C  Encounter Date: 2024   Encounter department: [facility] [place]    Assessment & Plan  Colitis  - Felt to be segmental " colitis associated with diverticulosis (SCAD)  -Treated initially with Hydrocort enemas  -Currently on balsalazide (750mg capsules) 1500 mg twice daily  -Resolved at this time and asymptomatic  -Last colonoscopy May 2024 no further disease on biopsies       Adenocarcinoma of colon (HCC)  -Small focus noted on September 2023 colonoscopy  -Repeat colonoscopy is November 2023 and May 2024 no further adenocarcinoma         History of Present Illness    Stevo Loo is a 89 y.o. male with history of hypertension who presents for follow-up, he had had a few colonoscopies earlier this year, first in August with regards to bright red blood per rectum, noting some diverticulosis and some edema in the proximal rectum which was biopsied, found to have characteristics of tubular adenoma with high-grade dysplasia and a minute fragment of adenocarcinoma.  Follow-up colonoscopy September 6 noted evidence of chronic active colitis involving the sigmoid colon consistent with scad or inflammatory bowel disease, also smooth polyp was noted in the rectum which was removed in piecemeal fashion with saline left, again with biopsies suggestive of adenomatous polyp with a small focus of adenocarcinoma.  He had a third colonoscopy to follow-up about 3 weeks ago on 11/16, showing what appeared to be more active inflammation extending from the rectum to about 25 cm from the anal verge, biopsies were also taken around the tattoo site from previous polypectomy and these did not show any evidence of adenoma or malignancy; otherwise no colonic biopsies and the apparent area of inflammation did appear consistent with UC.  He was started on hydrocortisone enemas.  Which initially did help.  He had repeat colonoscopy on May 8, 2024 with all normal-appearing mucosa.  Biopsies showed no further adenocarcinoma and certainly no chronic inflammation.      Review of Systems   Constitutional:  Negative for activity change, appetite change, chills,  "diaphoresis, fatigue and unexpected weight change.   HENT:  Negative for mouth sores, rhinorrhea, sore throat and trouble swallowing.    Eyes:  Negative for discharge, redness and visual disturbance.   Respiratory:  Negative for apnea, cough, choking, chest tightness and shortness of breath.    Cardiovascular:  Negative for chest pain, palpitations and leg swelling.   Gastrointestinal:  Negative for abdominal distention, abdominal pain, anal bleeding, blood in stool, constipation, diarrhea, nausea, rectal pain and vomiting.   Genitourinary:  Negative for difficulty urinating, dysuria, frequency and hematuria.   Musculoskeletal:  Negative for arthralgias, back pain, gait problem, joint swelling and myalgias.   Skin:  Negative for color change, pallor and rash.   Allergic/Immunologic: Negative for environmental allergies and food allergies.   Neurological:  Negative for dizziness, tremors, weakness, light-headedness, numbness and headaches.   Psychiatric/Behavioral:  Negative for agitation, behavioral problems, confusion and sleep disturbance. The patient is not nervous/anxious.            Objective    /82 (BP Location: Left arm, Patient Position: Sitting, Cuff Size: Standard)   Pulse (!) 49   Ht 5' 8\" (1.727 m)   Wt 84.5 kg (186 lb 3.2 oz)   SpO2 99%   BMI 28.31 kg/m²     Physical Exam  Constitutional:       General: He is not in acute distress.     Appearance: Normal appearance. He is not ill-appearing.   HENT:      Head: Normocephalic and atraumatic.   Eyes:      General: No scleral icterus.     Conjunctiva/sclera: Conjunctivae normal.   Cardiovascular:      Rate and Rhythm: Normal rate and regular rhythm.   Pulmonary:      Effort: Pulmonary effort is normal. No respiratory distress.      Breath sounds: Normal breath sounds.   Abdominal:      General: Bowel sounds are normal. There is no distension.      Palpations: Abdomen is soft.      Tenderness: There is no abdominal tenderness. There is no guarding. "   Skin:     General: Skin is warm and dry.   Neurological:      Mental Status: He is alert and oriented to person, place, and time.   Psychiatric:         Mood and Affect: Mood normal.         Behavior: Behavior normal.           Eduar Santana PA-C  [facility] - [facility]astro[facility]

## 2024-11-04 NOTE — ASSESSMENT & PLAN NOTE
-Small focus noted on September 2023 colonoscopy  -Repeat colonoscopy is November 2023 and May 2024 no further adenocarcinoma

## 2024-11-04 NOTE — PROGRESS NOTES
Ambulatory Visit  Name: Stevo Loo      : 1935      MRN: [id number]  Encounter Provider: Eduar Santana PA-C  Encounter Date: 2024   Encounter department: [facility] [place]    Assessment & Plan  Colitis  - Felt to be segmental colitis associated with diverticulosis (SCAD)  -Treated initially with Hydrocort enemas  -Currently on balsalazide (750mg capsules) 1500 mg twice daily  -Resolved at this time and asymptomatic  -Last colonoscopy May 2024 no further disease on biopsies  -Will have him follow-up in a month and decide at that time if repeat colonoscopy will be done.       Adenocarcinoma of colon (HCC)  -Small focus noted on 2023 colonoscopy  -Repeat colonoscopy is 2023 and May 2024 no further adenocarcinoma       Constipation, unspecified constipation type  -Stable on Metamucil and stool softener         History of Present Illness     Stevo Loo is a 89 y.o. male with history of hypertension who presents for follow-up, he had had a few colonoscopies earlier this year, first in August with regards to bright red blood per rectum, noting some diverticulosis and some edema in the proximal rectum which was biopsied, found to have characteristics of tubular adenoma with high-grade dysplasia and a minute fragment of adenocarcinoma.  Follow-up colonoscopy  noted evidence of chronic active colitis involving the sigmoid colon consistent with scad or inflammatory bowel disease, also smooth polyp was noted in the rectum which was removed in piecemeal fashion with saline left, again with biopsies suggestive of adenomatous polyp with a small focus of adenocarcinoma.  He had a third colonoscopy to follow-up about 3 weeks ago on , showing what appeared to be more active inflammation extending from the rectum to about 25 cm from the anal verge, biopsies were also taken around the tattoo site from previous polypectomy and these did  "not show any evidence of adenoma or malignancy; otherwise no colonic biopsies and the apparent area of inflammation did appear consistent with UC.  He was started on hydrocortisone enemas.  Which initially did help.  He had repeat colonoscopy on May 8, 2024 with all normal-appearing mucosa.  Biopsies showed no further adenocarcinoma and certainly no chronic inflammation.    He is currently doing well and offers no complaints.  He reports occasional constipation for which she takes Metamucil Gummies and a stool softener as needed.  He denies any loose and frequent stool or black or bloody stools.      Review of Systems   Constitutional:  Negative for activity change, appetite change, chills, diaphoresis, fatigue and unexpected weight change.   HENT:  Negative for mouth sores, rhinorrhea, sore throat and trouble swallowing.    Eyes:  Negative for discharge, redness and visual disturbance.   Respiratory:  Negative for apnea, cough, choking, chest tightness and shortness of breath.    Cardiovascular:  Negative for chest pain, palpitations and leg swelling.   Gastrointestinal:  Negative for abdominal distention, abdominal pain, anal bleeding, blood in stool, constipation, diarrhea, nausea, rectal pain and vomiting.   Genitourinary:  Negative for difficulty urinating, dysuria, frequency and hematuria.   Musculoskeletal:  Negative for arthralgias, back pain, gait problem, joint swelling and myalgias.   Skin:  Negative for color change, pallor and rash.   Allergic/Immunologic: Negative for environmental allergies and food allergies.   Neurological:  Negative for dizziness, tremors, weakness, light-headedness, numbness and headaches.   Psychiatric/Behavioral:  Negative for agitation, behavioral problems, confusion and sleep disturbance. The patient is not nervous/anxious.            Objective     /82 (BP Location: Left arm, Patient Position: Sitting, Cuff Size: Standard)   Pulse (!) 49   Ht 5' 8\" (1.727 m)   Wt 84.5 " kg (186 lb 3.2 oz)   SpO2 99%   BMI 28.31 kg/m²     Physical Exam  Constitutional:       General: He is not in acute distress.     Appearance: Normal appearance. He is not ill-appearing.   HENT:      Head: Normocephalic and atraumatic.   Eyes:      General: No scleral icterus.     Conjunctiva/sclera: Conjunctivae normal.   Cardiovascular:      Rate and Rhythm: Normal rate and regular rhythm.   Pulmonary:      Effort: Pulmonary effort is normal. No respiratory distress.      Breath sounds: Normal breath sounds.   Abdominal:      General: Bowel sounds are normal. There is no distension.      Palpations: Abdomen is soft.      Tenderness: There is no abdominal tenderness. There is no guarding.   Skin:     General: Skin is warm and dry.   Neurological:      Mental Status: He is alert and oriented to person, place, and time.   Psychiatric:         Mood and Affect: Mood normal.         Behavior: Behavior normal.           Eduar Santana PA-C  [facility] - Gastro[facility]rology

## 2025-05-30 ENCOUNTER — OFFICE VISIT (OUTPATIENT)
Dept: GASTROENTEROLOGY | Facility: AMBULARY SURGERY CENTER | Age: OVER 89
End: 2025-05-30
Payer: MEDICARE

## 2025-05-30 VITALS
HEIGHT: 68 IN | SYSTOLIC BLOOD PRESSURE: 164 MMHG | WEIGHT: 182 LBS | OXYGEN SATURATION: 97 % | BODY MASS INDEX: 27.58 KG/M2 | DIASTOLIC BLOOD PRESSURE: 78 MMHG | HEART RATE: 55 BPM

## 2025-05-30 DIAGNOSIS — K52.9 INFLAMMATORY BOWEL DISEASE: ICD-10-CM

## 2025-05-30 PROCEDURE — 99213 OFFICE O/P EST LOW 20 MIN: CPT | Performed by: PHYSICIAN ASSISTANT

## 2025-05-30 PROCEDURE — G2211 COMPLEX E/M VISIT ADD ON: HCPCS | Performed by: PHYSICIAN ASSISTANT

## 2025-05-30 RX ORDER — BALSALAZIDE DISODIUM 750 MG/1
1500 CAPSULE ORAL 2 TIMES DAILY
Qty: 360 CAPSULE | Refills: 3 | Status: SHIPPED | OUTPATIENT
Start: 2025-05-30

## 2025-05-30 NOTE — PROGRESS NOTES
Name: Stevo Loo      : 1935      MRN: [id number]  Encounter Provider: Sun Herring PA-C  Encounter Date: 2025   Encounter department: [facility] [place]  :  Assessment & Plan  Inflammatory bowel disease  Also with history of intramucosal adenocarcinoma of the rectum, status post polypectomy and post polypectomy surveillance colonoscopies showing no evidence of residual polyp/cancer.  Inflammatory findings consistent with scad versus IBD; appears to have responded to hydrocortisone enemas and is now doing well on maintenance with balsalazide    - Advised patient about importance of maintaining maintenance therapy in the setting of inflammatory bowel disease, will continue balsalazide at this time    - Check CBC and CMP with next labs, patient says he is having blood drawn in July    - Advised patient about alarm signs to look out for including rectal bleeding, tenesmus, diarrhea/change in bowel habits, unintentional weight loss    - Can plan to follow-up in the next 6 to 12 months for general monitoring, if patient is showing any alarm signs can check inflammatory markers and consider repeating colonoscopy, otherwise given the patient appears to be doing very well we will hold off on colonoscopy for now  Orders:    balsalazide (COLAZAL) 750 mg capsule; Take 2 capsules (1,500 mg total) by mouth 2 (two) times a day    CBC and differential; Future    Comprehensive metabolic panel; Future        History of Present Illness     Stevo Loo is a 89 y.o. male who presents for follow-up; he had been following our service due to history of intramucosal adenocarcinoma of the rectum and inflammatory bowel disease.  Had had colonoscopies a few times in  for evaluation of bright red blood per rectum; area of rectal erythema seen 2023 was biopsied positive for intramucosal adenocarcinoma.  Follow-up colonoscopy 2023 saw the removal of a rectal polyp with saline lift,  "biopsies showing again intramucosal adenocarcinoma and findings consistent with scad versus IBD in the left-sided colon.    Next colonoscopy 11/2023 again showed these inflammatory findings, biopsies at the polypectomy site showed no evidence of cancer or dysplasia, crypt abscesses and chronic inflammatory findings were seen however.  He was treated with hydrocortisone enemas and later transitioned to balsalazide.  Finally most recent colonoscopy in May 2024 showed relatively normal findings and no evidence of cancer or dysplasia on biopsies.    At this time the patient says he continues to take balsalazide regularly and has been doing well, denies tenesmus or diarrhea, has bowel movements once or twice a day which are brown, he has been using over-the-counter fiber supplementation which has been very helpful for him.  He denies any abdominal pain, nausea or vomiting, denies any unintentional weight loss and says that his appetite has been good.  Denies any rectal bleeding or melena.    HPI    Review of Systems A complete review of systems is negative other than that noted above in the HPI.      Current Medications[1]  Objective   /78 (BP Location: Left arm, Patient Position: Sitting, Cuff Size: Standard)   Pulse 55   Ht 5' 8\" (1.727 m)   Wt 82.6 kg (182 lb)   SpO2 97%   BMI 27.67 kg/m²     Physical Exam       Lab Results: I personally reviewed relevant lab results.       Results for orders placed during the hospital encounter of 05/08/24    Colonoscopy    Impression  Previously seen rectosigmoid tattoo is identified.  No mucosal abnormality, multiple random biopsies were taken of potential sites of prior polypectomy  Normal rectum, biopsies taken  Internal hemorrhoids  Otherwise normal colonoscopy to the terminal ileum      RECOMMENDATION:  Repeat colonoscopy in 1 year  Abnormal pathology      Discharge home  Resume regular diet  Resume home medications  Follow-up biopsy results  Call with any abdominal " pain, bleeding, fevers        Samson Melendez MD             [1]   Current Outpatient Medications   Medication Sig Dispense Refill    amLODIPine (NORVASC) 10 mg tablet Take 10 mg by mouth in the morning.      balsalazide (COLAZAL) 750 mg capsule Take 2 capsules (1,500 mg total) by mouth 2 (two) times a day 360 capsule 3    carvedilol (COREG) 12.5 mg tablet Take 12.5 mg by mouth in the morning and 12.5 mg in the evening. Take with meals.      clotrimazole-betamethasone (LOTRISONE) 1-0.05 % cream       FIBER COMPLETE PO Take 10 g by mouth in the morning      Krill Oil 1000 MG CAPS Take 1 tablet by mouth in the morning.      Multiple Vitamins-Minerals (ICAPS AREDS 2 PO) Take 1 capsule by mouth in the morning.      pantoprazole (PROTONIX) 40 mg tablet Take 1 tablet (40 mg total) by mouth daily (Patient taking differently: Take 40 mg by mouth if needed) 90 tablet 3     No current facility-administered medications for this visit.

## (undated) RX ORDER — CYCLOSPORINE 0.5 MG/ML: 1 EMULSION OPHTHALMIC TWICE A DAY